# Patient Record
Sex: FEMALE | Race: BLACK OR AFRICAN AMERICAN | Employment: FULL TIME | URBAN - METROPOLITAN AREA
[De-identification: names, ages, dates, MRNs, and addresses within clinical notes are randomized per-mention and may not be internally consistent; named-entity substitution may affect disease eponyms.]

---

## 2022-05-05 PROBLEM — N83.201 BILATERAL OVARIAN CYSTS: Status: ACTIVE | Noted: 2022-05-05

## 2022-05-05 PROBLEM — Z34.00 NORMAL PREGNANCY, FIRST: Status: ACTIVE | Noted: 2022-05-05

## 2022-05-12 PROBLEM — O99.019 SICKLE CELL TRAIT IN MOTHER AFFECTING PREGNANCY (HCC): Status: ACTIVE | Noted: 2022-05-12

## 2022-05-19 VITALS — WEIGHT: 132 LBS | BODY MASS INDEX: 23.76 KG/M2

## 2022-05-26 ENCOUNTER — INITIAL PRENATAL (OUTPATIENT)
Dept: OBGYN CLINIC | Age: 32
End: 2022-05-26

## 2022-05-26 VITALS — BODY MASS INDEX: 24.66 KG/M2 | DIASTOLIC BLOOD PRESSURE: 82 MMHG | SYSTOLIC BLOOD PRESSURE: 122 MMHG | WEIGHT: 137 LBS

## 2022-05-26 DIAGNOSIS — Z11.3 SCREENING FOR STD (SEXUALLY TRANSMITTED DISEASE): ICD-10-CM

## 2022-05-26 DIAGNOSIS — Z34.01 ENCOUNTER FOR SUPERVISION OF NORMAL FIRST PREGNANCY IN FIRST TRIMESTER: Primary | ICD-10-CM

## 2022-05-26 DIAGNOSIS — O99.019 SICKLE CELL TRAIT IN MOTHER AFFECTING PREGNANCY (HCC): ICD-10-CM

## 2022-05-26 DIAGNOSIS — D57.3 SICKLE CELL TRAIT IN MOTHER AFFECTING PREGNANCY (HCC): ICD-10-CM

## 2022-05-26 PROBLEM — Z34.00 NORMAL PREGNANCY, FIRST: Status: ACTIVE | Noted: 2022-05-05

## 2022-05-26 PROCEDURE — 0502F SUBSEQUENT PRENATAL CARE: CPT | Performed by: OBSTETRICS & GYNECOLOGY

## 2022-05-26 NOTE — PROGRESS NOTES
[facility]  presents for NOB-   NOB with santiago was pre-Epic transition:  Genetic Screening / Teratology Counseling (Include's patient, baby's father, or anyone in either family with:)    Patient's age 28 years of older as of estimated date of delivery?   No Avelina's Chorea   No   [facility]assemia ([facility] [place], [address], or  backgrd): MCV less than 80   No Mental Retardation/Autism   Yes   Nural Tube Defect (Meningomyelocele, Spina Bifida, or Anencephaly)   No If Yes, was person tested for Fragile X?     Congential Heart Defect   No Other Inherited Genetic or Chromosomal Disorder   No   Down Syndrome   No Maternal Metabolic Disorder   No   Avinash-Sachs (Ashkenazi Yarsani,   No Patient or [de-identified] Father had a child with birth defects not listed above   No   Canavan Disease (Ashkenazi Yarsani)   No Recurrent Pregnancy Loss or a Stillbirth   No   Familial Dysautonomia (Ashkenazi Yarsani)   No Medications (including supplements, vitamins, herbs or OTC drugs)/illicit/recreational drugs/alcohol since last menstrual period   Yes   Sickle Cell Disease or Trait ()   Yes If Yes, agent(s) and strength/dosage     Hemophilia or Other Blood Disorders   No List any other genetic risks:     Muscular Dystrophy   No Cystic Fibrosis   No   Comments/Counseling:            GC/CT sent on urine, up to date on pap. Tatiana Valdez. PL and MFM notes reviewed as applicable.   Taking Prenatal Vitamins: Yes  She is noticing:  no unusual complaints

## 2022-05-30 LAB
N GONORRHOEA RRNA SPEC QL NAA+PROBE: NEGATIVE
SPECIMEN SOURCE: NORMAL

## 2022-05-31 LAB
C TRACH RRNA SPEC QL NAA+PROBE: NEGATIVE
SPECIMEN SOURCE: NORMAL

## 2022-06-08 ENCOUNTER — ROUTINE PRENATAL (OUTPATIENT)
Dept: OBGYN CLINIC | Age: 32
End: 2022-06-08
Payer: COMMERCIAL

## 2022-06-08 VITALS — DIASTOLIC BLOOD PRESSURE: 77 MMHG | SYSTOLIC BLOOD PRESSURE: 116 MMHG | HEART RATE: 74 BPM

## 2022-06-08 DIAGNOSIS — O99.611 CONSTIPATION IN PREGNANCY IN FIRST TRIMESTER: ICD-10-CM

## 2022-06-08 DIAGNOSIS — K59.00 CONSTIPATION IN PREGNANCY IN FIRST TRIMESTER: ICD-10-CM

## 2022-06-08 DIAGNOSIS — N83.202 BILATERAL OVARIAN CYSTS: Primary | ICD-10-CM

## 2022-06-08 DIAGNOSIS — O21.9 NAUSEA AND VOMITING IN PREGNANCY: ICD-10-CM

## 2022-06-08 DIAGNOSIS — O99.019 SICKLE CELL TRAIT IN MOTHER AFFECTING PREGNANCY (HCC): ICD-10-CM

## 2022-06-08 DIAGNOSIS — D57.3 SICKLE CELL TRAIT IN MOTHER AFFECTING PREGNANCY (HCC): ICD-10-CM

## 2022-06-08 DIAGNOSIS — N83.201 BILATERAL OVARIAN CYSTS: Primary | ICD-10-CM

## 2022-06-08 DIAGNOSIS — N28.89 LEFT KIDNEY MASS: ICD-10-CM

## 2022-06-08 DIAGNOSIS — O09.91 HIGH-RISK PREGNANCY IN FIRST TRIMESTER: ICD-10-CM

## 2022-06-08 DIAGNOSIS — Z3A.13 13 WEEKS GESTATION OF PREGNANCY: ICD-10-CM

## 2022-06-08 PROCEDURE — 99204 OFFICE O/P NEW MOD 45 MIN: CPT | Performed by: OBSTETRICS & GYNECOLOGY

## 2022-06-08 RX ORDER — ACETAMINOPHEN 500 MG
500 TABLET ORAL AS NEEDED
COMMUNITY

## 2022-06-08 ASSESSMENT — PATIENT HEALTH QUESTIONNAIRE - PHQ9
SUM OF ALL RESPONSES TO PHQ QUESTIONS 1-9: 1
2. FEELING DOWN, DEPRESSED OR HOPELESS: 0
SUM OF ALL RESPONSES TO PHQ QUESTIONS 1-9: 1
1. LITTLE INTEREST OR PLEASURE IN DOING THINGS: 1
SUM OF ALL RESPONSES TO PHQ9 QUESTIONS 1 & 2: 1
SUM OF ALL RESPONSES TO PHQ QUESTIONS 1-9: 1
SUM OF ALL RESPONSES TO PHQ QUESTIONS 1-9: 1

## 2022-06-08 NOTE — PROGRESS NOTES
pregnancy in first trimester 06/08/2022     Priority: Low     06/08/22 at [facility]: Patient counseled on stool softener for constipation.  Nausea and vomiting in pregnancy 06/08/2022     Priority: Low     06/08/22 at [facility]: Patient states B6 is not working well. Pt states she has zofran at home. Pt counseled on using stool softener with Zofran.  Sickle cell trait in mother affecting pregnancy ([address] 05/12/2022     Priority: Low     - FOB ____________  - Urine culture q trimester _____       [employer] Normal pregnancy, first 05/05/2022     Priority: Low     Genetic counseling was performed by physician after reviewing patient's genetic history. The patient's Down syndrome age associated risk, as well as, risks of additional aneuploidy and genetic syndromes, are reduced by approximately 50% with a normal first trimester anatomy including, nuchal translucency and nasal bone. Ultrasound alone does not rule out all abnormalities of genetics and development. Maternal serum screening for aneuploidy was discussed with the patient including first trimester SUBHA-A/hCG, second trimester Quad screen (either in isolation or sequential with SUBHA-A) as well as non-invasive prenatal testing (NIPT) for aneuploidy from a maternal blood sample. Positive predictive and negative predictive values for these tests were explained, questions answered. Patient understands that these are screening tests that only assesses risk for select abnormalities (trisomies 15, 25, and 21, and sex chromosome abnormalities (NIPT), as well as markers for placental health (SUBHA-A) and risk for open neural tube defects (quad)). NIPT is designed for high risk populations, but should be considered by all patients who desire the current best option for screening for applicable genetic abnormalities. Limitations of technology discussed based on maternal age, technical aspects of tests, and maternal BMI reviewed.   All questions answered and concerns discussed. Patient elected to proceed with Ultrasound only with no serum screening. 1) Bilateral Ovarian Masses  Likely dermoid in origin  Pain precautions  If torsion, will need surgery. If requires surgery, plan indocin course  Would not expect significant change in size due to pregnancy hormones. 2) Renal concern- records requested. Conservative management at this time. Addressed normal pregnancy complaints, reassured and offered suggestions for care  Reviewed gestational age precautions and activity goals/limitations  Nutritional counseling as well as specific goals based on current maternal and fetal status  Options for GERD therapy if becomes symptomatic over course of pregnancy  Gestational age appropriate preventive care regarding communicable disease transmission and vaccines as appropriate (including flu, TDaP, and COVID.)  Additional plans and concerns as documented in problem list.   All questions answered and concerns discussed. Will have pt return in 4-6 weeks for anatomy and ovarian follow up. I have spent 50 minutes reviewing previous notes, test results and face to face with the patient discussing the diagnosis and importance of compliance with the treatment plan, in addition to ultrasound findings, as well as documenting on the date of the visit (6/9/2022)       An electronic signature was used to authenticate this note.   Sergei Waldrop MD    Patient Active Problem List   Diagnosis Code    Normal pregnancy, first Z34.00    Bilateral ovarian cysts N83.201, N83.202    Sickle cell trait in mother affecting [facility] ([facility] O99.019, D57.3    High-risk pregnancy in first trimester O09.91    Constipation in pregnancy in first trimester O99.611, K59.00    Nausea and vomiting in pregnancy O21.9    Left kidney mass N28.89

## 2022-06-23 ENCOUNTER — ROUTINE PRENATAL (OUTPATIENT)
Dept: OBGYN CLINIC | Age: 32
End: 2022-06-23

## 2022-06-23 VITALS — SYSTOLIC BLOOD PRESSURE: 120 MMHG | DIASTOLIC BLOOD PRESSURE: 70 MMHG | WEIGHT: 139 LBS | BODY MASS INDEX: 25.02 KG/M2

## 2022-06-23 DIAGNOSIS — O09.91 HIGH-RISK PREGNANCY IN FIRST TRIMESTER: ICD-10-CM

## 2022-06-23 DIAGNOSIS — N28.89 LEFT KIDNEY MASS: ICD-10-CM

## 2022-06-23 DIAGNOSIS — O99.611 CONSTIPATION IN PREGNANCY IN FIRST TRIMESTER: ICD-10-CM

## 2022-06-23 DIAGNOSIS — K59.00 CONSTIPATION IN PREGNANCY IN FIRST TRIMESTER: ICD-10-CM

## 2022-06-23 DIAGNOSIS — O21.9 NAUSEA AND VOMITING IN PREGNANCY: ICD-10-CM

## 2022-06-23 PROBLEM — Z34.00 NORMAL PREGNANCY, FIRST: Status: RESOLVED | Noted: 2022-05-05 | Resolved: 2022-06-23

## 2022-06-23 PROCEDURE — 99902 PR PRENATAL VISIT: CPT | Performed by: OBSTETRICS & GYNECOLOGY

## 2022-06-23 NOTE — PROGRESS NOTES
Hina Downey  presents for [address]. . 15w1d. PL and MFM notes reviewed as applicable. Taking Prenatal Vitamins: Yes  She is noticing:  no unusual complaints  Start Fe / Ca  Start LDA    No problem-specific Assessment & Plan notes found for this encounter.

## 2022-07-13 ENCOUNTER — ROUTINE PRENATAL (OUTPATIENT)
Dept: OBGYN CLINIC | Age: 32
End: 2022-07-13
Payer: COMMERCIAL

## 2022-07-13 VITALS — DIASTOLIC BLOOD PRESSURE: 72 MMHG | SYSTOLIC BLOOD PRESSURE: 119 MMHG

## 2022-07-13 DIAGNOSIS — Z3A.18 18 WEEKS GESTATION OF PREGNANCY: ICD-10-CM

## 2022-07-13 DIAGNOSIS — D57.3 SICKLE CELL TRAIT IN MOTHER AFFECTING PREGNANCY (HCC): Primary | ICD-10-CM

## 2022-07-13 DIAGNOSIS — N83.209 OVARIAN CYST AFFECTING PREGNANCY IN SECOND TRIMESTER, ANTEPARTUM: ICD-10-CM

## 2022-07-13 DIAGNOSIS — K59.00 CONSTIPATION IN PREGNANCY, SECOND TRIMESTER: ICD-10-CM

## 2022-07-13 DIAGNOSIS — O34.82 OVARIAN CYST AFFECTING PREGNANCY IN SECOND TRIMESTER, ANTEPARTUM: ICD-10-CM

## 2022-07-13 DIAGNOSIS — Z84.1 FAMILY HISTORY OF KIDNEY DISEASE IN MOTHER: ICD-10-CM

## 2022-07-13 DIAGNOSIS — O21.9 NAUSEA AND VOMITING IN PREGNANCY: ICD-10-CM

## 2022-07-13 DIAGNOSIS — N28.89 LEFT KIDNEY MASS: ICD-10-CM

## 2022-07-13 DIAGNOSIS — O99.612 CONSTIPATION IN PREGNANCY, SECOND TRIMESTER: ICD-10-CM

## 2022-07-13 DIAGNOSIS — O09.92 HIGH-RISK PREGNANCY IN SECOND TRIMESTER: ICD-10-CM

## 2022-07-13 DIAGNOSIS — O99.019 SICKLE CELL TRAIT IN MOTHER AFFECTING PREGNANCY (HCC): Primary | ICD-10-CM

## 2022-07-13 PROCEDURE — 76825 ECHO EXAM OF FETAL HEART: CPT | Performed by: OBSTETRICS & GYNECOLOGY

## 2022-07-13 PROCEDURE — 99215 OFFICE O/P EST HI 40 MIN: CPT | Performed by: OBSTETRICS & GYNECOLOGY

## 2022-07-13 PROCEDURE — 76811 OB US DETAILED SNGL FETUS: CPT | Performed by: OBSTETRICS & GYNECOLOGY

## 2022-07-13 RX ORDER — CALCIUM CARBONATE 500(1250)
500 TABLET ORAL DAILY
COMMUNITY

## 2022-07-13 RX ORDER — ACETAMINOPHEN 160 MG
TABLET,DISINTEGRATING ORAL
COMMUNITY

## 2022-07-13 RX ORDER — FERROUS SULFATE 325(65) MG
325 TABLET ORAL
COMMUNITY

## 2022-07-13 ASSESSMENT — PATIENT HEALTH QUESTIONNAIRE - PHQ9
SUM OF ALL RESPONSES TO PHQ9 QUESTIONS 1 & 2: 0
1. LITTLE INTEREST OR PLEASURE IN DOING THINGS: 0
SUM OF ALL RESPONSES TO PHQ QUESTIONS 1-9: 0
2. FEELING DOWN, DEPRESSED OR HOPELESS: 0
SUM OF ALL RESPONSES TO PHQ QUESTIONS 1-9: 0

## 2022-07-13 NOTE — PROGRESS NOTES
Fe  07/13/22 at [facility]: Normal anatomy; limited echo due to early gestational age; 2 anterior fibroids seen. Dopplers WNL, CL 3.46cm    · F/U 8 weeks [facility] growth and repeat echo  · PTL warnings given      Ovarian cyst affecting pregnancy in second trimester, antepartum 05/05/2022     Priority: Medium     - suspicious for dermoids. Diagnosed~  at time of conception.  - advised to avoid surgery until PP. To [facility] for 2nd opinion. 06/08/22 at [facility]: Bilateral dermoids present today, similar size and appearance to prior. 07/13/22 at [facility]: Bilateral dermoid seen today  · Pain precautions given        Sickle cell trait in mother affecting pregnancy ([address] 05/12/2022     Priority: Low     - FOB to get blood work but doesn't suspect he has sickle cell or sickle cell trait  5/5/22 UC Neg  07/13/22 at [facility]:   · Urine culture every trimester          Addressed normal pregnancy complaints, reassured and offered suggestions for care  Reviewed gestational age precautions and activity goals/limitations  Nutritional counseling as well as specific goals based on current maternal and fetal status  Options for GERD therapy if becomes symptomatic over course of pregnancy  Gestational age appropriate preventive care regarding communicable disease transmission and vaccines as appropriate (including flu, TDaP, and COVID.)  Additional plans and concerns as documented in problem list.   All questions answered and concerns discussed. An electronic signature was used to authenticate this note. Jaime Lamar MD    I have spent 40 minutes reviewing previous notes, test results and face to face with the patient discussing the diagnosis and importance of compliance with the treatment plan, in addition to ultrasound findings, as well as documenting on the day of the visit (07/13/2022).     Return in 8 week(s)  Growth  Anatomy      Patient Active Problem List   Diagnosis Code    Ovarian cyst affecting pregnancy in second trimester, antepartum O34.82, N83.209    Sickle cell trait in mother affecting pregnancy ([facility] O99.019, D57.3    High-risk pregnancy in second trimester O09.92    Family history of kidney disease in mother Z80.4

## 2022-07-18 ENCOUNTER — ROUTINE PRENATAL (OUTPATIENT)
Dept: OBGYN CLINIC | Age: 32
End: 2022-07-18

## 2022-07-18 VITALS — DIASTOLIC BLOOD PRESSURE: 66 MMHG | WEIGHT: 141 LBS | SYSTOLIC BLOOD PRESSURE: 116 MMHG | BODY MASS INDEX: 25.38 KG/M2

## 2022-07-18 DIAGNOSIS — O09.92 HIGH-RISK PREGNANCY IN SECOND TRIMESTER: Primary | ICD-10-CM

## 2022-07-18 DIAGNOSIS — Z84.1 FAMILY HISTORY OF KIDNEY DISEASE IN MOTHER: ICD-10-CM

## 2022-07-18 DIAGNOSIS — D57.3 SICKLE CELL TRAIT IN MOTHER AFFECTING PREGNANCY (HCC): ICD-10-CM

## 2022-07-18 DIAGNOSIS — O99.019 SICKLE CELL TRAIT IN MOTHER AFFECTING PREGNANCY (HCC): ICD-10-CM

## 2022-07-18 PROCEDURE — 99902 PR PRENATAL VISIT: CPT | Performed by: OBSTETRICS & GYNECOLOGY

## 2022-07-18 NOTE — PROGRESS NOTES
Charisse Frost  presents for [address]. . 18w5d. PL and MFM notes reviewed as applicable. Taking Prenatal Vitamins: Yes  She is noticing:  no unusual complaints  Already voided, will get culture NV  FOB to have bloodwork today    No problem-specific Assessment & Plan notes found for this encounter.

## 2022-07-22 ENCOUNTER — TELEPHONE (OUTPATIENT)
Dept: OBGYN CLINIC | Age: 32
End: 2022-07-22

## 2022-07-22 NOTE — TELEPHONE ENCOUNTER
VM from pt c/o sharp \"ovary\" pain for 10-15 seconds then dull, achy back pain. Per , no intervention needed at this time. Call back to pt. No answer. LM with pain protocol and to call us prn.

## 2022-08-01 ENCOUNTER — TELEPHONE (OUTPATIENT)
Dept: OBGYN CLINIC | Age: 32
End: 2022-08-01

## 2022-08-01 NOTE — TELEPHONE ENCOUNTER
See previous note. Advised pt on Monistat7 QHS x 7. Avoid tub baths and wet bathing suits for 7 days. Pt states understanding.

## 2022-08-01 NOTE — TELEPHONE ENCOUNTER
VM received from pt c/o vaginal itching that started yesterday. P tis 20w5d. Call back to pt. No answer. LM to return call.

## 2022-08-10 ENCOUNTER — ROUTINE PRENATAL (OUTPATIENT)
Dept: OBGYN CLINIC | Age: 32
End: 2022-08-10

## 2022-08-10 VITALS — DIASTOLIC BLOOD PRESSURE: 62 MMHG | WEIGHT: 152 LBS | BODY MASS INDEX: 27.36 KG/M2 | SYSTOLIC BLOOD PRESSURE: 108 MMHG

## 2022-08-10 DIAGNOSIS — O99.019 SICKLE CELL TRAIT IN MOTHER AFFECTING PREGNANCY (HCC): ICD-10-CM

## 2022-08-10 DIAGNOSIS — D57.3 SICKLE CELL TRAIT IN MOTHER AFFECTING PREGNANCY (HCC): ICD-10-CM

## 2022-08-10 DIAGNOSIS — O09.92 HIGH-RISK PREGNANCY IN SECOND TRIMESTER: Primary | ICD-10-CM

## 2022-08-10 DIAGNOSIS — Z3A.22 22 WEEKS GESTATION OF PREGNANCY: ICD-10-CM

## 2022-08-10 DIAGNOSIS — O09.92 HIGH-RISK PREGNANCY IN SECOND TRIMESTER: ICD-10-CM

## 2022-08-10 DIAGNOSIS — Z84.1 FAMILY HISTORY OF KIDNEY DISEASE IN MOTHER: ICD-10-CM

## 2022-08-10 PROCEDURE — 99902 PR PRENATAL VISIT: CPT | Performed by: OBSTETRICS & GYNECOLOGY

## 2022-08-10 NOTE — PROGRESS NOTES
Bronwyn HoltEustis  presents for Rosa Isela [address]. . 22w0d. PL and MFM notes reviewed as applicable. Taking Prenatal Vitamins: Yes  She is noticing:  no unusual complaints. Urine culture sent today. No problem-specific Assessment & Plan notes found for this encounter.

## 2022-08-13 LAB
BACTERIA SPEC CULT: NORMAL
SERVICE CMNT-IMP: NORMAL

## 2022-09-07 ENCOUNTER — ROUTINE PRENATAL (OUTPATIENT)
Dept: OBGYN CLINIC | Age: 32
End: 2022-09-07
Payer: COMMERCIAL

## 2022-09-07 VITALS — DIASTOLIC BLOOD PRESSURE: 67 MMHG | SYSTOLIC BLOOD PRESSURE: 107 MMHG

## 2022-09-07 DIAGNOSIS — O26.832 RENAL DISEASE DURING PREGNANCY IN SECOND TRIMESTER: ICD-10-CM

## 2022-09-07 DIAGNOSIS — N83.209 OVARIAN CYST AFFECTING PREGNANCY IN SECOND TRIMESTER, ANTEPARTUM: ICD-10-CM

## 2022-09-07 DIAGNOSIS — O34.82 OVARIAN CYST AFFECTING PREGNANCY IN SECOND TRIMESTER, ANTEPARTUM: ICD-10-CM

## 2022-09-07 DIAGNOSIS — O99.019 SICKLE CELL TRAIT IN MOTHER AFFECTING PREGNANCY (HCC): ICD-10-CM

## 2022-09-07 DIAGNOSIS — Z84.1 FAMILY HISTORY OF KIDNEY DISEASE IN MOTHER: ICD-10-CM

## 2022-09-07 DIAGNOSIS — D57.3 SICKLE CELL TRAIT IN MOTHER AFFECTING PREGNANCY (HCC): ICD-10-CM

## 2022-09-07 DIAGNOSIS — O09.92 HIGH-RISK PREGNANCY IN SECOND TRIMESTER: Primary | ICD-10-CM

## 2022-09-07 PROCEDURE — 76816 OB US FOLLOW-UP PER FETUS: CPT | Performed by: OBSTETRICS & GYNECOLOGY

## 2022-09-07 PROCEDURE — 99215 OFFICE O/P EST HI 40 MIN: CPT | Performed by: OBSTETRICS & GYNECOLOGY

## 2022-09-07 ASSESSMENT — PATIENT HEALTH QUESTIONNAIRE - PHQ9
SUM OF ALL RESPONSES TO PHQ QUESTIONS 1-9: 0
2. FEELING DOWN, DEPRESSED OR HOPELESS: 0
1. LITTLE INTEREST OR PLEASURE IN DOING THINGS: 0
SUM OF ALL RESPONSES TO PHQ QUESTIONS 1-9: 0
SUM OF ALL RESPONSES TO PHQ QUESTIONS 1-9: 0
SUM OF ALL RESPONSES TO PHQ9 QUESTIONS 1 & 2: 0
SUM OF ALL RESPONSES TO PHQ QUESTIONS 1-9: 0

## 2022-09-07 NOTE — PROGRESS NOTES
MFM Consultation    Ana Vaughn (: 1990) is a 36 y.o. Amelia Neeru at 26w0d with 2022, by Last Menstrual Period. 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 Scheduled to see Primary OB [facility]) on 2022. No edema. Denies preeclamptic symptoms. Reports good fetal movement. No bleeding, LOF, or vaginal pressure. Pt reports occasional cramping and ctxs. Significant pain this spring resulting in work up that identified bilateral dermoid ovarian masses and left renal mass. No change in size/appearance of ovarian masses over past 4 months. Pt is seeing renal re renal mass. Conservative care recommended at this time. Interval history since prior appt reviewed and updated as indicated. Review of Systems - per HPI; otherwise unremarkable. Exam:     Vitals:    22 1550   BP: 107/67       Ultrasound: Please see formal ultrasound report under imaging tab. ASSESSMENT/PLAN:  Patient Active Problem List    Diagnosis Date Noted    Renal disease during pregnancy in second trimester 2022     Priority: High     22 at [facility]: Left renal mass present today. Patient saw nephrologist at [facility] ([phone]); Records requested. Per pt report, will monitor. 22 at [facility]: Pt reports having MRI 3/24/22 on Kidneys. Pt reports Nephrologist said that he didn't suspect cancer but will do a Biopsy postpartum 2022. Pt reports occasional discomfort over left kidney which Nephrologist was not concerned about. Message left at [facility] to obtain records. High-risk pregnancy in second trimester 2022     Priority: High     22 at [facility]: Normal NT and nasal bone. NT 1.5mm. NIPT declined.    2022 15w1d start LDA, Ca / Fe  22 at [facility]: Normal anatomy; limited echo due to early gestational age; 2 anterior fibroids seen. Dopplers WNL, CL 3.46cm  09/07/22 at [facility]: Appropriate fetal growth; Normal repeat echo; AC 42 %, Overall 48  %, GUERO 17 cm, Dopplers WNL, BPP 8/8    No F/U MFM-refer back as needed  PTL warnings given  May deliver vaginally      Ovarian cyst affecting pregnancy in second trimester, antepartum 05/05/2022     Priority: Medium     - suspicious for dermoids. Diagnosed~  at time of conception.  - advised to avoid surgery until PP. To [facility] for 2nd opinion. 06/08/22 at [facility]: Bilateral dermoids present today, similar size and appearance to prior. 07/13/22 at [facility]: Bilateral dermoid seen today  09/07/22 at [facility]: No change  Pain precautions given        Sickle cell trait in mother affecting pregnancy ([facility] 05/12/2022     Priority: Low     - FOB to get blood work but doesn't suspect he has sickle cell or sickle cell trait   - 7/18/2022 = negative  5/5/22 UC Neg  09/07/22 at [facility]:   8/1/22 Neg          Addressed normal pregnancy complaints, reassured and offered suggestions for care  Reviewed gestational age precautions and activity goals/limitations  Nutritional counseling as well as specific goals based on current maternal and fetal status  Options for GERD therapy if becomes symptomatic over course of pregnancy  Gestational age appropriate preventive care regarding communicable disease transmission and vaccines as appropriate (including flu, TDaP, and COVID.)  Additional plans and concerns as documented in problem list.   All questions answered and concerns discussed. An electronic signature was used to authenticate this note.   Janes Shaikh MD    I have spent 40 minutes reviewing previous notes, test results and face to face with the patient discussing the diagnosis and importance of compliance with the treatment plan, in addition to ultrasound findings, as well as documenting on the day of the visit (09/07/2022).       Patient Active Problem List   Diagnosis Code    Ovarian cyst affecting pregnancy in second trimester, antepartum O34.82, N83.209    Sickle cell trait in mother affecting pregnancy ([facility] O99.019, D57.3    High-risk pregnancy in second trimester O09.92    Renal disease during pregnancy in second trimester O26.832

## 2022-09-12 ENCOUNTER — ROUTINE PRENATAL (OUTPATIENT)
Dept: OBGYN CLINIC | Age: 32
End: 2022-09-12

## 2022-09-12 VITALS — SYSTOLIC BLOOD PRESSURE: 110 MMHG | DIASTOLIC BLOOD PRESSURE: 62 MMHG | WEIGHT: 159 LBS | BODY MASS INDEX: 28.62 KG/M2

## 2022-09-12 DIAGNOSIS — O99.019 SICKLE CELL TRAIT IN MOTHER AFFECTING PREGNANCY (HCC): ICD-10-CM

## 2022-09-12 DIAGNOSIS — O09.92 HIGH-RISK PREGNANCY IN SECOND TRIMESTER: Primary | ICD-10-CM

## 2022-09-12 DIAGNOSIS — Z13.0 SCREENING FOR IRON DEFICIENCY ANEMIA: ICD-10-CM

## 2022-09-12 DIAGNOSIS — O26.832 RENAL DISEASE DURING PREGNANCY IN SECOND TRIMESTER: ICD-10-CM

## 2022-09-12 DIAGNOSIS — N83.209 OVARIAN CYST AFFECTING PREGNANCY IN SECOND TRIMESTER, ANTEPARTUM: ICD-10-CM

## 2022-09-12 DIAGNOSIS — Z13.1 SCREENING FOR DIABETES MELLITUS: ICD-10-CM

## 2022-09-12 DIAGNOSIS — O34.82 OVARIAN CYST AFFECTING PREGNANCY IN SECOND TRIMESTER, ANTEPARTUM: ICD-10-CM

## 2022-09-12 DIAGNOSIS — D57.3 SICKLE CELL TRAIT IN MOTHER AFFECTING PREGNANCY (HCC): ICD-10-CM

## 2022-09-12 LAB
BASOPHILS # BLD: 0 K/UL (ref 0–0.2)
BASOPHILS NFR BLD: 0 % (ref 0–2)
DIFFERENTIAL METHOD BLD: ABNORMAL
EOSINOPHIL # BLD: 0.1 K/UL (ref 0–0.8)
EOSINOPHIL NFR BLD: 1 % (ref 0.5–7.8)
ERYTHROCYTE [DISTWIDTH] IN BLOOD BY AUTOMATED COUNT: 12.3 % (ref 11.9–14.6)
HCT VFR BLD AUTO: 33.6 % (ref 35.8–46.3)
HGB BLD-MCNC: 11.6 G/DL (ref 11.7–15.4)
IMM GRANULOCYTES # BLD AUTO: 0 K/UL (ref 0–0.5)
IMM GRANULOCYTES NFR BLD AUTO: 1 % (ref 0–5)
LYMPHOCYTES # BLD: 1.4 K/UL (ref 0.5–4.6)
LYMPHOCYTES NFR BLD: 17 % (ref 13–44)
MCH RBC QN AUTO: 33.3 PG (ref 26.1–32.9)
MCHC RBC AUTO-ENTMCNC: 34.5 G/DL (ref 31.4–35)
MCV RBC AUTO: 96.6 FL (ref 79.6–97.8)
MONOCYTES # BLD: 0.7 K/UL (ref 0.1–1.3)
MONOCYTES NFR BLD: 8 % (ref 4–12)
NEUTS SEG # BLD: 6.1 K/UL (ref 1.7–8.2)
NEUTS SEG NFR BLD: 73 % (ref 43–78)
NRBC # BLD: 0 K/UL (ref 0–0.2)
PLATELET # BLD AUTO: 223 K/UL (ref 150–450)
PMV BLD AUTO: 10 FL (ref 9.4–12.3)
RBC # BLD AUTO: 3.48 M/UL (ref 4.05–5.2)
WBC # BLD AUTO: 8.3 K/UL (ref 4.3–11.1)

## 2022-09-12 PROCEDURE — 99902 PR PRENATAL VISIT: CPT | Performed by: OBSTETRICS & GYNECOLOGY

## 2022-09-12 NOTE — PROGRESS NOTES
Suasn King  presents for ELISA w/ 1 hr gtt & CBC. . 26w5d. PL and MFM notes reviewed as applicable. Taking Prenatal Vitamins: Yes  She is noticing:  no unusual complaints     No problem-specific Assessment & Plan notes found for this encounter.

## 2022-09-14 LAB — GLUCOSE 1 HOUR: 146 MG/DL

## 2022-09-26 ENCOUNTER — ROUTINE PRENATAL (OUTPATIENT)
Dept: OBGYN CLINIC | Age: 32
End: 2022-09-26

## 2022-09-26 VITALS — SYSTOLIC BLOOD PRESSURE: 114 MMHG | WEIGHT: 159 LBS | BODY MASS INDEX: 28.62 KG/M2 | DIASTOLIC BLOOD PRESSURE: 66 MMHG

## 2022-09-26 DIAGNOSIS — O26.832 RENAL DISEASE DURING PREGNANCY IN SECOND TRIMESTER: ICD-10-CM

## 2022-09-26 DIAGNOSIS — O09.92 HIGH-RISK PREGNANCY IN SECOND TRIMESTER: Primary | ICD-10-CM

## 2022-09-26 PROCEDURE — 99902 PR PRENATAL VISIT: CPT | Performed by: OBSTETRICS & GYNECOLOGY

## 2022-09-26 NOTE — PROGRESS NOTES
Elisabeth Garcia  presents for [address]. . 28w5d. PL and MFM notes reviewed as applicable. Taking Prenatal Vitamins: Yes  She is noticing:  no unusual complaints    No problem-specific Assessment & Plan notes found for this encounter.

## 2022-09-30 ENCOUNTER — NURSE ONLY (OUTPATIENT)
Dept: OBGYN CLINIC | Age: 32
End: 2022-09-30

## 2022-09-30 DIAGNOSIS — Z13.1 SCREENING FOR DIABETES MELLITUS: Primary | ICD-10-CM

## 2022-09-30 LAB
GESTATIONAL 3HR GTT: ABNORMAL
GLUCOSE 1H P 100 G GLC PO SERPL-MCNC: 198 MG/DL (ref 65–180)
GLUCOSE 2 HOUR: 194 MG/DL (ref 65–155)
GLUCOSE P FAST SERPL-MCNC: 80 MG/DL (ref 65–95)
GLUCOSE, 3 HOUR: 148 MG/DL (ref 65–140)

## 2022-10-06 ENCOUNTER — TELEPHONE (OUTPATIENT)
Dept: OBGYN CLINIC | Age: 32
End: 2022-10-06

## 2022-10-06 DIAGNOSIS — O24.419 GESTATIONAL DIABETES MELLITUS (GDM) IN THIRD TRIMESTER, GESTATIONAL DIABETES METHOD OF CONTROL UNSPECIFIED: Primary | ICD-10-CM

## 2022-10-06 RX ORDER — LANCETS 30 GAUGE
1 EACH MISCELLANEOUS DAILY
Qty: 200 EACH | Refills: 4 | Status: SHIPPED | OUTPATIENT
Start: 2022-10-06

## 2022-10-06 RX ORDER — GLUCOSAMINE HCL/CHONDROITIN SU 500-400 MG
CAPSULE ORAL
Qty: 200 STRIP | Refills: 4 | Status: SHIPPED | OUTPATIENT
Start: 2022-10-06

## 2022-10-06 RX ORDER — BLOOD-GLUCOSE METER
KIT MISCELLANEOUS
Qty: 1 KIT | Refills: 0 | Status: SHIPPED | OUTPATIENT
Start: 2022-10-06

## 2022-10-06 NOTE — TELEPHONE ENCOUNTER
Pt called with questions regarding GDM dx. Reviewed how to check blood sugars,keep a log and diet restrictions. Pt voiced understanding. Informed pt that referrals have been sent to Nutrition and MFM and supplies have been sent to pharmacy. Pt voiced understanding.

## 2022-10-10 ENCOUNTER — ROUTINE PRENATAL (OUTPATIENT)
Dept: OBGYN CLINIC | Age: 32
End: 2022-10-10

## 2022-10-10 VITALS — DIASTOLIC BLOOD PRESSURE: 68 MMHG | BODY MASS INDEX: 28.44 KG/M2 | WEIGHT: 158 LBS | SYSTOLIC BLOOD PRESSURE: 108 MMHG

## 2022-10-10 DIAGNOSIS — O24.419 GESTATIONAL DIABETES MELLITUS (GDM) IN THIRD TRIMESTER, GESTATIONAL DIABETES METHOD OF CONTROL UNSPECIFIED: ICD-10-CM

## 2022-10-10 DIAGNOSIS — O26.833 RENAL DISEASE DURING PREGNANCY IN THIRD TRIMESTER: ICD-10-CM

## 2022-10-10 DIAGNOSIS — O09.93 HIGH-RISK PREGNANCY IN THIRD TRIMESTER: Primary | ICD-10-CM

## 2022-10-10 PROCEDURE — 99902 PR PRENATAL VISIT: CPT | Performed by: OBSTETRICS & GYNECOLOGY

## 2022-10-10 NOTE — PROGRESS NOTES
[facility]  presents for Rosa Isela [address]. . 30w5d. PL and MFM notes reviewed as applicable. Taking Prenatal Vitamins: Yes  She is noticing:  no unusual complaints. Has started checking sugars (GDM). They are really low, recommend she start adding some carbs back in slowly. Ranges ~120 +/- PP encouraged. Sees dietician next week, MFM soon. No problem-specific Assessment & Plan notes found for this encounter.

## 2022-10-11 ENCOUNTER — CLINICAL DOCUMENTATION (OUTPATIENT)
Dept: OBGYN CLINIC | Age: 32
End: 2022-10-11

## 2022-10-11 NOTE — PROGRESS NOTES
Email message sent to patient regarding upcoming appt with MFM. Information sent to patient on how to check blood sugars, what to expect with GDM and blood sugar log for her to document blood sugars everyday until she comes to her appt. Pt instructed bring log with her to her appt.

## 2022-10-13 ENCOUNTER — TELEPHONE (OUTPATIENT)
Dept: OBGYN CLINIC | Age: 32
End: 2022-10-13

## 2022-10-13 NOTE — TELEPHONE ENCOUNTER
Pt called with c/o pubic bone pain. Pt states the pain improves with walking. Pt denies LOF, vaginal bleeding and/or contractions. Pt reports good fetal movement. Encouraged pt to use belly band and tylenol. Pt agree's and voiced understanding.

## 2022-10-17 ENCOUNTER — HOSPITAL ENCOUNTER (OUTPATIENT)
Dept: DIABETES SERVICES | Age: 32
Setting detail: RECURRING SERIES
Discharge: HOME | End: 2022-10-20
Payer: COMMERCIAL

## 2022-10-17 VITALS — WEIGHT: 159 LBS | BODY MASS INDEX: 28.62 KG/M2

## 2022-10-17 PROCEDURE — G0109 DIAB MANAGE TRN IND/GROUP: HCPCS

## 2022-10-17 NOTE — PROGRESS NOTES
Gestational Diabetes Self-Management Support Plan    Services Provided: Pt received education on nutrition and meal planning during pregnancy. Emotional support for adjustment to diagnosis was provided. Care Plan/Recommendations:  Pt instructed to record blood sugar 4x/day and record all meals and snacks. Pt to bring information to appointments with [facility]. Notes for Follow Up:   1. Barriers to checking blood glucose and adherence to meal plan identified: none  2. Barriers to psychological adjustment to diagnosis identified: none  3. Patient needs to be seen by [facility] ASAP due to: pt unsure if she has an appointment.        Lieutenant Loving, [address] [place] [address], LD, CDE  [facility] [address] [facility]

## 2022-10-23 ENCOUNTER — HOSPITAL ENCOUNTER (EMERGENCY)
Age: 32
Discharge: HOME | End: 2022-10-23
Attending: EMERGENCY MEDICINE | Admitting: EMERGENCY MEDICINE
Payer: COMMERCIAL

## 2022-10-23 VITALS
DIASTOLIC BLOOD PRESSURE: 62 MMHG | RESPIRATION RATE: 18 BRPM | WEIGHT: 159 LBS | TEMPERATURE: 100 F | HEIGHT: 62 IN | SYSTOLIC BLOOD PRESSURE: 114 MMHG | OXYGEN SATURATION: 99 % | BODY MASS INDEX: 29.26 KG/M2 | HEART RATE: 99 BPM

## 2022-10-23 DIAGNOSIS — J10.1 INFLUENZA A: Primary | ICD-10-CM

## 2022-10-23 LAB
FLUAV AG NPH QL IA: POSITIVE
FLUBV AG NPH QL IA: NEGATIVE
SARS-COV-2 RDRP RESP QL NAA+PROBE: NOT DETECTED
SOURCE: NORMAL
SPECIMEN SOURCE: ABNORMAL
STREP, MOLECULAR: NOT DETECTED

## 2022-10-23 PROCEDURE — 87651 STREP A DNA AMP PROBE: CPT

## 2022-10-23 PROCEDURE — 2580000003 HC RX 258

## 2022-10-23 PROCEDURE — 99284 EMERGENCY DEPT VISIT MOD MDM: CPT

## 2022-10-23 PROCEDURE — 87635 SARS-COV-2 COVID-19 AMP PRB: CPT

## 2022-10-23 PROCEDURE — 87804 INFLUENZA ASSAY W/OPTIC: CPT

## 2022-10-23 RX ORDER — OSELTAMIVIR PHOSPHATE 75 MG/1
75 CAPSULE ORAL 2 TIMES DAILY
Qty: 10 CAPSULE | Refills: 0 | Status: SHIPPED | OUTPATIENT
Start: 2022-10-23 | End: 2022-10-28

## 2022-10-23 RX ORDER — ACETAMINOPHEN 325 MG/1
650 TABLET ORAL
Status: DISCONTINUED | OUTPATIENT
Start: 2022-10-23 | End: 2022-10-23 | Stop reason: HOSPADM

## 2022-10-23 RX ORDER — 0.9 % SODIUM CHLORIDE 0.9 %
1000 INTRAVENOUS SOLUTION INTRAVENOUS
Status: COMPLETED | OUTPATIENT
Start: 2022-10-23 | End: 2022-10-23

## 2022-10-23 RX ADMIN — SODIUM CHLORIDE 1000 ML: 9 INJECTION, SOLUTION INTRAVENOUS at 18:29

## 2022-10-23 ASSESSMENT — ENCOUNTER SYMPTOMS
ABDOMINAL PAIN: 0
COUGH: 1
SHORTNESS OF BREATH: 0
SORE THROAT: 1

## 2022-10-23 ASSESSMENT — PAIN - FUNCTIONAL ASSESSMENT: PAIN_FUNCTIONAL_ASSESSMENT: 0-10

## 2022-10-23 ASSESSMENT — PAIN SCALES - GENERAL: PAINLEVEL_OUTOF10: 7

## 2022-10-23 NOTE — Clinical Note
Poonam Campuzano was seen and treated in our emergency department on 10/23/2022. She may return to work on 10/27/2022. If you have any questions or concerns, please don't hesitate to call.       STONE Farmer

## 2022-10-23 NOTE — ED TRIAGE NOTES
Pt is 32 weeks pregnant. States she has had a cough, fever, bodyaches, headache, and sore throat since this morning.

## 2022-10-23 NOTE — ED PROVIDER NOTES
[facility] Emergency Department Provider Note                   PCP:                Aditi Jeter MD               Age: 36 y.o. Sex: female       ICD-10-CM    1. Influenza A  J10.1           DISPOSITION Decision To Discharge 10/23/2022 07:03:13 PM        Orders Placed This Encounter   Procedures    Rapid influenza A/B antigens    Group A Strep Screen By PCR    COVID-19, Rapid        Aman Bishop is a 36 y.o. female who presents to the Emergency Department with chief complaint of    Chief Complaint   Patient presents with    Cough      59-year-old female whom is 32 weeks pregnant is presenting to the emergency department with chief complaint of 1 day history of cough and congestion that has worsened today. She is complaining of associated sore throat, headaches, nausea, myalgias and arthralgias, and fevers with T-max of 100.1F. Reports she has not taken anything over-the-counter for symptom relief and has minimal appetite. She denies any recent sick contacts however she is a teacher in a public setting. She denies any ear pain, chest pain, and/or shortness of breath. The patient is somnolent with minimal interaction on exam; the the  has given me most of the history however the patient nods yes and no throughout the history and is able to speak for herself. The history is provided by the patient and the spouse. All other systems reviewed and are negative. Review of Systems   Constitutional:  Positive for activity change, appetite change, fatigue and fever. HENT:  Positive for congestion and sore throat. Negative for ear pain. Respiratory:  Positive for cough. Negative for shortness of breath. Cardiovascular:  Negative for chest pain. Gastrointestinal:  Negative for abdominal pain. Musculoskeletal:  Positive for arthralgias and myalgias. Neurological:  Positive for headaches. All other systems reviewed and are negative.     Past Medical History:   Diagnosis Date Constipation in pregnancy, second trimester 6/8/2022 06/08/22 at [facility]: Patient counseled on stool softener for constipation. 07/13/22 at [facility]: Reports improvement  RESOLVED    Nausea and vomiting in pregnancy 6/8/2022 06/08/22 at [facility]: Patient states B6 is not working well. Pt states she has zofran at home. Pt counseled on using stool softener with Zofran.  07/13/22 at [facility]: Improvement  RESOLVED        Past Surgical History:   Procedure Laterality Date    WISDOM TOOTH EXTRACTION  2010 & 2012        Family History   Problem Relation Age of Onset    Diabetes Maternal Grandfather         Social History     Socioeconomic History    Marital status:    Tobacco Use    Smoking status: Never    Smokeless tobacco: Never   Substance and Sexual Activity    Alcohol use: Not Currently    Drug use: Never        Allergies: Amoxicillin and Amoxicillin-pot clavulanate    Discharge Medication List as of 10/23/2022  7:04 PM        CONTINUE these medications which have NOT CHANGED    Details   glucose monitoring (FREESTYLE FREEDOM) kit Disp-1 kit, R-0, NormalUse as directed to check sugars fasting and 1 hour after the first bite of each meal 4x daily (breakfast, lunch, dinner)      blood glucose monitor strips Use as directed to check sugars fasting and 1 hour after the first bite of each meal 4x daily (breakfast, lunch, dinner). Dispense sufficient amount for indicated testing frequency plus additional to accommodate PRN testing needs. , Disp-200 strip, R-4, N ormal      Lancets MISC DAILY Starting Thu 10/6/2022, Disp-200 each, R-4, NormalUse as directed to check sugars fasting and 1 hour after the first bite of each meal 4x daily (breakfast, lunch, dinner)      calcium carbonate (OSCAL) 500 MG TABS tablet Take 500 mg by mouth dailyHistorical Med      Cholecalciferol (VITAMIN D3) 50 MCG (2000 UT) CAPS Take by mouthHistorical Med      ferrous sulfate (IRON 325) 325 (65 Fe) MG tablet Take 325 mg by mouth daily (with breakfast)Historical Med      acetaminophen (TYLENOL) 500 MG tablet Take 500 mg by mouth as needed for PainHistorical Med      Prenatal MV & Min w/FA-DHA (ONE A DAY PRENATAL PO) Take by mouthHistorical Med              Vitals signs and nursing note reviewed. No data found. Physical Exam  Vitals reviewed. Constitutional:       General: She is not in acute distress. Appearance: Normal appearance. She is normal weight. She is not ill-appearing. Comments: Pt is pregnant   HENT:      Head: Normocephalic and atraumatic. Right Ear: Tympanic membrane, ear canal and external ear normal.      Left Ear: Tympanic membrane, ear canal and external ear normal.      Nose: Nose normal. No congestion or rhinorrhea. Mouth/Throat:      Mouth: Mucous membranes are moist.      Pharynx: Oropharynx is clear. Posterior oropharyngeal erythema present. No oropharyngeal exudate. Eyes:      Extraocular Movements: Extraocular movements intact. Conjunctiva/sclera: Conjunctivae normal.      Pupils: Pupils are equal, round, and reactive to light. Cardiovascular:      Rate and Rhythm: Normal rate and regular rhythm. Pulses: Normal pulses. Heart sounds: Normal heart sounds. No murmur heard. Pulmonary:      Effort: Pulmonary effort is normal. No respiratory distress. Breath sounds: Normal breath sounds. No stridor. No wheezing or rhonchi. Abdominal:      General: Abdomen is flat. Bowel sounds are normal. There is no distension. Palpations: Abdomen is soft. Tenderness: There is no abdominal tenderness. There is no guarding or rebound. Musculoskeletal:         General: No swelling, deformity or signs of injury. Normal range of motion. Cervical back: Normal range of motion and neck supple. No rigidity or tenderness. Skin:     General: Skin is warm and dry. Coloration: Skin is not pale. Findings: No erythema, lesion or rash.    Neurological:      General: No focal deficit present. Mental Status: She is alert and oriented to person, place, and time. Mental status is at baseline. Psychiatric:         Mood and Affect: Affect is flat. Behavior: Behavior normal.         Thought Content: Thought content normal.         Judgment: Judgment normal.        MDM  Number of Diagnoses or Management Options  Influenza A  Diagnosis management comments: 53-year-old female who is 32 weeks pregnant presenting to the emergency department with chief complaint of cough and congestion. Differential COVID, flu, strep, PE    The patient's symptoms and presentation are consistent with that of an upper respiratory infection. Due to the fact that the patient is asymptomatic of chest pain and shortness of breath I have a very low suspicion that the patient has a cardiopulmonary abnormality such as ACS or PE that would require any imaging or additional lab work. Because of the patient's pregnancy status there is a need to rule out streptococcal infection. will also assess for COVID and flu infection with swabs. The patient refuses Tylenol and Zofran for fever and nausea relief, respectively despite her current symptoms and fever with tachycardia. I explained to the patient that both medications were safe to use in pregnancy however she decided against use of both. Normal saline 1 L bolus started. Influenza swab returned positive. Prescribed Tamiflu regimen. Advised patient to increase her fluid intake, take Tylenol for symptom improvement, and return to the emergency department if she experienced any worsening symptoms.        Amount and/or Complexity of Data Reviewed  Clinical lab tests: ordered and reviewed        Procedures      Labs Reviewed   RAPID INFLUENZA A/B ANTIGENS - Abnormal; Notable for the following components:       Result Value    Influenza A Ag Positive (*)     All other components within normal limits   GROUP A STREP SCREEN BY PCR   COVID-19, RAPID        No orders to display                      ED Course as of 10/24/22 0038   Sun Oct 23, 2022   1807 Pt refuses tylenol [ET]   6510 Pending swabs [ET]   1812 Pt refusing zofran. Start NS 1 L. [ET]      ED Course User Index  [ET] STONE Green        Voice dictation software was used during the making of this note. This software is not perfect and grammatical and other typographical errors may be present. This note has not been completely proofread for errors.      STONE Green  10/24/22 8251

## 2022-10-23 NOTE — ED NOTES
I have reviewed discharge instructions with the patient. The patient verbalized understanding. Patient left ED via Discharge Method: ambulatory to Home with spouse. Opportunity for questions and clarification provided. Patient given 1 scripts. No e-sign. To continue your aftercare when you leave the hospital, you may receive an automated call from our care team to check in on how you are doing. This is a free service and part of our promise to provide the best care and service to meet your aftercare needs.  If you have questions, or wish to unsubscribe from this service please call [phone]. Thank you for Choosing our [address] [place] [address] Emergency Department.          Fadi Caldwell RN  10/23/22 1564

## 2022-10-23 NOTE — DISCHARGE INSTRUCTIONS
Your hospital work-up today demonstrates you have tested positive for influenza. We have prescribed Tamiflu antiviral medication which is safe to take in pregnancy. Remain hydrated and take vitamin D, vitamin C, and zinc to boost your immune system. Tylenol is safe to take for fever, body aches, and joint pain in pregnancy. If your symptoms worsen please return to the emergency department for evaluation and treatment.

## 2022-10-23 NOTE — Clinical Note
Kourtney Bautista was seen and treated in our emergency department on 10/23/2022. She may return to work on 10/27/2022. If you have any questions or concerns, please don't hesitate to call.       STONE Kendrick

## 2022-10-24 ENCOUNTER — TELEPHONE (OUTPATIENT)
Dept: OBGYN CLINIC | Age: 32
End: 2022-10-24

## 2022-10-31 ENCOUNTER — TELEPHONE (OUTPATIENT)
Dept: OBGYN CLINIC | Age: 32
End: 2022-10-31

## 2022-10-31 NOTE — TELEPHONE ENCOUNTER
Celia Bergeron that she is having sinus pressure, light headed and having cold sweats. Called her back and Recommended that see if she can get in with PCP or urgent care. She states she is feeling better. If any changes she will be proceed to one or the other. She also said she had had difficulty get blood for testing glucose. Told to increase water intake.   She has an appt on 11/3/22

## 2022-11-03 ENCOUNTER — ROUTINE PRENATAL (OUTPATIENT)
Dept: OBGYN CLINIC | Age: 32
End: 2022-11-03

## 2022-11-03 VITALS — SYSTOLIC BLOOD PRESSURE: 112 MMHG | WEIGHT: 158 LBS | BODY MASS INDEX: 28.9 KG/M2 | DIASTOLIC BLOOD PRESSURE: 74 MMHG

## 2022-11-03 DIAGNOSIS — O09.92 HIGH-RISK PREGNANCY IN SECOND TRIMESTER: ICD-10-CM

## 2022-11-03 DIAGNOSIS — O24.419 GESTATIONAL DIABETES MELLITUS (GDM) IN THIRD TRIMESTER, GESTATIONAL DIABETES METHOD OF CONTROL UNSPECIFIED: ICD-10-CM

## 2022-11-03 DIAGNOSIS — O26.832 RENAL DISEASE DURING PREGNANCY IN SECOND TRIMESTER: Primary | ICD-10-CM

## 2022-11-03 PROCEDURE — 99902 PR PRENATAL VISIT: CPT | Performed by: OBSTETRICS & GYNECOLOGY

## 2022-11-03 NOTE — PROGRESS NOTES
[facility]  presents for Rosa Isela Lewis [age]. . 34w1d. PL and M notes reviewed as applicable. Taking Prenatal Vitamins: Yes  She is noticing:  no unusual complaints  Had flu, much better now. Had a hard time checking sugars, partially d/t being volume depleted. Checking again, states they are about the same as before. REc send to [facility] asap. No problem-specific Assessment & Plan notes found for this encounter.

## 2022-11-14 ENCOUNTER — ROUTINE PRENATAL (OUTPATIENT)
Dept: OBGYN CLINIC | Age: 32
End: 2022-11-14

## 2022-11-14 VITALS
HEIGHT: 62 IN | WEIGHT: 161 LBS | SYSTOLIC BLOOD PRESSURE: 120 MMHG | DIASTOLIC BLOOD PRESSURE: 78 MMHG | BODY MASS INDEX: 29.63 KG/M2

## 2022-11-14 DIAGNOSIS — Z36.85 SCREENING, ANTENATAL, FOR STREPTOCOCCUS B: Primary | ICD-10-CM

## 2022-11-14 DIAGNOSIS — O09.92 HIGH-RISK PREGNANCY IN SECOND TRIMESTER: ICD-10-CM

## 2022-11-14 DIAGNOSIS — O26.832 RENAL DISEASE DURING PREGNANCY IN SECOND TRIMESTER: ICD-10-CM

## 2022-11-14 DIAGNOSIS — Z36.85 SCREENING, ANTENATAL, FOR STREPTOCOCCUS B: ICD-10-CM

## 2022-11-14 PROCEDURE — 99902 PR PRENATAL VISIT: CPT | Performed by: OBSTETRICS & GYNECOLOGY

## 2022-11-14 NOTE — PROGRESS NOTES
Beth Negrete  presents for Rosa Isela Lewis [age]. . 35w5d. PL and MFM notes reviewed as applicable. Taking Prenatal Vitamins: Yes  She is noticing:  no unusual complaints. States sugars good and sending to MFM weekly    No problem-specific Assessment & Plan notes found for this encounter.

## 2022-11-16 ENCOUNTER — ANESTHESIA (OUTPATIENT)
Dept: LABOR AND DELIVERY | Age: 32
End: 2022-11-16
Payer: COMMERCIAL

## 2022-11-16 ENCOUNTER — HOSPITAL ENCOUNTER (INPATIENT)
Age: 32
LOS: 2 days | Discharge: HOME | End: 2022-11-18
Attending: OBSTETRICS & GYNECOLOGY | Admitting: OBSTETRICS & GYNECOLOGY
Payer: COMMERCIAL

## 2022-11-16 ENCOUNTER — ANESTHESIA EVENT (OUTPATIENT)
Dept: LABOR AND DELIVERY | Age: 32
End: 2022-11-16
Payer: COMMERCIAL

## 2022-11-16 PROBLEM — Z37.9 NORMAL LABOR: Status: ACTIVE | Noted: 2022-11-16

## 2022-11-16 PROBLEM — O42.919 PRETERM PREMATURE RUPTURE OF MEMBRANES (PPROM) WITH UNKNOWN ONSET OF LABOR: Status: ACTIVE | Noted: 2022-11-16

## 2022-11-16 LAB
ABO + RH BLD: NORMAL
AMNISURE, POC: POSITIVE
BLOOD GROUP ANTIBODIES SERPL: NORMAL
ERYTHROCYTE [DISTWIDTH] IN BLOOD BY AUTOMATED COUNT: 13 % (ref 11.9–14.6)
GLUCOSE BLD STRIP.AUTO-MCNC: 81 MG/DL (ref 65–100)
HCT VFR BLD AUTO: 34.9 % (ref 35.8–46.3)
HGB BLD-MCNC: 12 G/DL (ref 11.7–15.4)
Lab: NORMAL
MCH RBC QN AUTO: 33 PG (ref 26.1–32.9)
MCHC RBC AUTO-ENTMCNC: 34.4 G/DL (ref 31.4–35)
MCV RBC AUTO: 95.9 FL (ref 82–102)
NEGATIVE QC PASS/FAIL: NORMAL
NRBC # BLD: 0 K/UL (ref 0–0.2)
PLATELET # BLD AUTO: 219 K/UL (ref 150–450)
PMV BLD AUTO: 9.5 FL (ref 9.4–12.3)
POSITIVE QC PASS/FAIL: NORMAL
RBC # BLD AUTO: 3.64 M/UL (ref 4.05–5.2)
SERVICE CMNT-IMP: NORMAL
SPECIMEN EXP DATE BLD: NORMAL
WBC # BLD AUTO: 5.9 K/UL (ref 4.3–11.1)

## 2022-11-16 PROCEDURE — 59025 FETAL NON-STRESS TEST: CPT

## 2022-11-16 PROCEDURE — 2500000003 HC RX 250 WO HCPCS: Performed by: OBSTETRICS & GYNECOLOGY

## 2022-11-16 PROCEDURE — 3700000025 EPIDURAL BLOCK: Performed by: ANESTHESIOLOGY

## 2022-11-16 PROCEDURE — 7210000100 HC LABOR FEE PER 1 HR

## 2022-11-16 PROCEDURE — 2580000003 HC RX 258: Performed by: OBSTETRICS & GYNECOLOGY

## 2022-11-16 PROCEDURE — 87081 CULTURE SCREEN ONLY: CPT

## 2022-11-16 PROCEDURE — 7100000011 HC PHASE II RECOVERY - ADDTL 15 MIN

## 2022-11-16 PROCEDURE — 6360000002 HC RX W HCPCS: Performed by: OBSTETRICS & GYNECOLOGY

## 2022-11-16 PROCEDURE — 00HU33Z INSERTION OF INFUSION DEVICE INTO SPINAL CANAL, PERCUTANEOUS APPROACH: ICD-10-PCS | Performed by: ANESTHESIOLOGY

## 2022-11-16 PROCEDURE — 59400 OBSTETRICAL CARE: CPT | Performed by: OBSTETRICS & GYNECOLOGY

## 2022-11-16 PROCEDURE — 86850 RBC ANTIBODY SCREEN: CPT

## 2022-11-16 PROCEDURE — 82962 GLUCOSE BLOOD TEST: CPT

## 2022-11-16 PROCEDURE — 1100000000 HC RM PRIVATE

## 2022-11-16 PROCEDURE — 7220000101 HC DELIVERY VAGINAL/SINGLE

## 2022-11-16 PROCEDURE — 36415 COLL VENOUS BLD VENIPUNCTURE: CPT

## 2022-11-16 PROCEDURE — 7100000010 HC PHASE II RECOVERY - FIRST 15 MIN

## 2022-11-16 PROCEDURE — 99283 EMERGENCY DEPT VISIT LOW MDM: CPT

## 2022-11-16 PROCEDURE — 6370000000 HC RX 637 (ALT 250 FOR IP): Performed by: OBSTETRICS & GYNECOLOGY

## 2022-11-16 PROCEDURE — 85027 COMPLETE CBC AUTOMATED: CPT

## 2022-11-16 PROCEDURE — 6360000002 HC RX W HCPCS: Performed by: NURSE ANESTHETIST, CERTIFIED REGISTERED

## 2022-11-16 RX ORDER — SODIUM CHLORIDE 0.9 % (FLUSH) 0.9 %
5-40 SYRINGE (ML) INJECTION EVERY 12 HOURS SCHEDULED
Status: DISCONTINUED | OUTPATIENT
Start: 2022-11-16 | End: 2022-11-16 | Stop reason: HOSPADM

## 2022-11-16 RX ORDER — SODIUM CHLORIDE 0.9 % (FLUSH) 0.9 %
5-40 SYRINGE (ML) INJECTION PRN
Status: DISCONTINUED | OUTPATIENT
Start: 2022-11-16 | End: 2022-11-18 | Stop reason: HOSPADM

## 2022-11-16 RX ORDER — SODIUM CHLORIDE, SODIUM LACTATE, POTASSIUM CHLORIDE, CALCIUM CHLORIDE 600; 310; 30; 20 MG/100ML; MG/100ML; MG/100ML; MG/100ML
INJECTION, SOLUTION INTRAVENOUS CONTINUOUS
Status: DISCONTINUED | OUTPATIENT
Start: 2022-11-16 | End: 2022-11-16 | Stop reason: HOSPADM

## 2022-11-16 RX ORDER — SODIUM CHLORIDE, SODIUM LACTATE, POTASSIUM CHLORIDE, AND CALCIUM CHLORIDE .6; .31; .03; .02 G/100ML; G/100ML; G/100ML; G/100ML
500 INJECTION, SOLUTION INTRAVENOUS PRN
Status: DISCONTINUED | OUTPATIENT
Start: 2022-11-16 | End: 2022-11-16 | Stop reason: HOSPADM

## 2022-11-16 RX ORDER — ACETAMINOPHEN 325 MG/1
650 TABLET ORAL EVERY 4 HOURS PRN
Status: DISCONTINUED | OUTPATIENT
Start: 2022-11-16 | End: 2022-11-16 | Stop reason: HOSPADM

## 2022-11-16 RX ORDER — IBUPROFEN 800 MG/1
800 TABLET ORAL EVERY 8 HOURS
Status: DISCONTINUED | OUTPATIENT
Start: 2022-11-16 | End: 2022-11-18 | Stop reason: HOSPADM

## 2022-11-16 RX ORDER — METHYLERGONOVINE MALEATE 0.2 MG/ML
200 INJECTION INTRAVENOUS PRN
Status: DISCONTINUED | OUTPATIENT
Start: 2022-11-16 | End: 2022-11-18 | Stop reason: HOSPADM

## 2022-11-16 RX ORDER — ROPIVACAINE HYDROCHLORIDE 2 MG/ML
INJECTION, SOLUTION EPIDURAL; INFILTRATION; PERINEURAL PRN
Status: DISCONTINUED | OUTPATIENT
Start: 2022-11-16 | End: 2022-11-16 | Stop reason: SDUPTHER

## 2022-11-16 RX ORDER — SODIUM CHLORIDE 9 MG/ML
25 INJECTION, SOLUTION INTRAVENOUS PRN
Status: DISCONTINUED | OUTPATIENT
Start: 2022-11-16 | End: 2022-11-16 | Stop reason: HOSPADM

## 2022-11-16 RX ORDER — POLYETHYLENE GLYCOL 3350 17 G/17G
17 POWDER, FOR SOLUTION ORAL DAILY
Status: DISCONTINUED | OUTPATIENT
Start: 2022-11-16 | End: 2022-11-18 | Stop reason: HOSPADM

## 2022-11-16 RX ORDER — OXYCODONE HYDROCHLORIDE 5 MG/1
5 TABLET ORAL EVERY 4 HOURS PRN
Status: DISCONTINUED | OUTPATIENT
Start: 2022-11-16 | End: 2022-11-18 | Stop reason: HOSPADM

## 2022-11-16 RX ORDER — ONDANSETRON 8 MG/1
8 TABLET, ORALLY DISINTEGRATING ORAL EVERY 8 HOURS PRN
Status: DISCONTINUED | OUTPATIENT
Start: 2022-11-16 | End: 2022-11-18 | Stop reason: HOSPADM

## 2022-11-16 RX ORDER — ASPIRIN 81 MG/1
81 TABLET ORAL DAILY
Status: ON HOLD | COMMUNITY
End: 2022-11-18 | Stop reason: HOSPADM

## 2022-11-16 RX ORDER — ACETAMINOPHEN 500 MG
1000 TABLET ORAL EVERY 8 HOURS PRN
Status: DISCONTINUED | OUTPATIENT
Start: 2022-11-16 | End: 2022-11-18 | Stop reason: HOSPADM

## 2022-11-16 RX ORDER — SODIUM CHLORIDE, SODIUM LACTATE, POTASSIUM CHLORIDE, AND CALCIUM CHLORIDE .6; .31; .03; .02 G/100ML; G/100ML; G/100ML; G/100ML
1000 INJECTION, SOLUTION INTRAVENOUS PRN
Status: DISCONTINUED | OUTPATIENT
Start: 2022-11-16 | End: 2022-11-16 | Stop reason: HOSPADM

## 2022-11-16 RX ORDER — SODIUM CHLORIDE 9 MG/ML
INJECTION, SOLUTION INTRAVENOUS PRN
Status: DISCONTINUED | OUTPATIENT
Start: 2022-11-16 | End: 2022-11-18 | Stop reason: HOSPADM

## 2022-11-16 RX ORDER — SODIUM CHLORIDE 0.9 % (FLUSH) 0.9 %
5-40 SYRINGE (ML) INJECTION EVERY 12 HOURS SCHEDULED
Status: DISCONTINUED | OUTPATIENT
Start: 2022-11-16 | End: 2022-11-18 | Stop reason: HOSPADM

## 2022-11-16 RX ORDER — MISOPROSTOL 200 UG/1
200 TABLET ORAL PRN
Status: DISCONTINUED | OUTPATIENT
Start: 2022-11-16 | End: 2022-11-18 | Stop reason: HOSPADM

## 2022-11-16 RX ORDER — SODIUM CHLORIDE, SODIUM LACTATE, POTASSIUM CHLORIDE, CALCIUM CHLORIDE 600; 310; 30; 20 MG/100ML; MG/100ML; MG/100ML; MG/100ML
INJECTION, SOLUTION INTRAVENOUS CONTINUOUS
Status: DISCONTINUED | OUTPATIENT
Start: 2022-11-16 | End: 2022-11-18 | Stop reason: HOSPADM

## 2022-11-16 RX ORDER — SODIUM CHLORIDE 0.9 % (FLUSH) 0.9 %
5-40 SYRINGE (ML) INJECTION PRN
Status: DISCONTINUED | OUTPATIENT
Start: 2022-11-16 | End: 2022-11-16 | Stop reason: HOSPADM

## 2022-11-16 RX ORDER — TRANEXAMIC ACID 10 MG/ML
1000 INJECTION, SOLUTION INTRAVENOUS
Status: DISCONTINUED | OUTPATIENT
Start: 2022-11-16 | End: 2022-11-16 | Stop reason: HOSPADM

## 2022-11-16 RX ORDER — LANOLIN
CREAM (ML) TOPICAL PRN
Status: DISCONTINUED | OUTPATIENT
Start: 2022-11-16 | End: 2022-11-18 | Stop reason: HOSPADM

## 2022-11-16 RX ORDER — ONDANSETRON 2 MG/ML
4 INJECTION INTRAMUSCULAR; INTRAVENOUS EVERY 6 HOURS PRN
Status: DISCONTINUED | OUTPATIENT
Start: 2022-11-16 | End: 2022-11-16 | Stop reason: HOSPADM

## 2022-11-16 RX ADMIN — CEFAZOLIN 1000 MG: 1 INJECTION, POWDER, FOR SOLUTION INTRAMUSCULAR; INTRAVENOUS at 12:09

## 2022-11-16 RX ADMIN — ROPIVACAINE HYDROCHLORIDE 8 ML/HR: 2 INJECTION, SOLUTION EPIDURAL; INFILTRATION; PERINEURAL at 13:38

## 2022-11-16 RX ADMIN — SODIUM CHLORIDE, SODIUM LACTATE, POTASSIUM CHLORIDE, AND CALCIUM CHLORIDE: 600; 310; 30; 20 INJECTION, SOLUTION INTRAVENOUS at 02:55

## 2022-11-16 RX ADMIN — IBUPROFEN 800 MG: 800 TABLET, FILM COATED ORAL at 21:42

## 2022-11-16 RX ADMIN — Medication 1 MILLI-UNITS/MIN: at 10:42

## 2022-11-16 RX ADMIN — SODIUM CHLORIDE, SODIUM LACTATE, POTASSIUM CHLORIDE, AND CALCIUM CHLORIDE: 600; 310; 30; 20 INJECTION, SOLUTION INTRAVENOUS at 07:17

## 2022-11-16 RX ADMIN — ROPIVACAINE HYDROCHLORIDE 8 ML: 2 INJECTION, SOLUTION EPIDURAL; INFILTRATION; PERINEURAL at 13:36

## 2022-11-16 RX ADMIN — CEFAZOLIN SODIUM 2000 MG: 100 INJECTION, POWDER, LYOPHILIZED, FOR SOLUTION INTRAVENOUS at 02:57

## 2022-11-16 NOTE — PROGRESS NOTES
Srom around MN, clear fluid  Cx 3 at 0545  Not on pit  Can't assess strip- pt on ball  Reactive w/o decels when tracing  Ctx's 3-6min apart.   Start pit

## 2022-11-16 NOTE — PROGRESS NOTES
SBAR OUT Report: Mother    Verbal report given to TU Fernandez RN (full name & credentials) on this patient, who is now being transferred to MIU (unit) for routine progression of patient care. The patient is not wearing a green \"Anesthesia-Duramorph\" band. Report consisted of patient's Situation, Background, Assessment and Recommendations (SBAR).  ID bands were compared with the identification form, and verified with the patient and receiving nurse. Information from the Nurse Handoff Report, Intake/Output, and MAR and the [place] Report was reviewed with the receiving nurse; opportunity for questions and clarification provided.

## 2022-11-16 NOTE — H&P
Obstetrics History & Physical    Name: Akira Gaitan  Date: 2022 2:21 AM  MRN#: [id number]  : 1990  Age/Sex: 29 y. o.female  Admission Date: 2022  Admitting Provider: Yissel Nix MD    HPI: Akira Gaitan is a 29 y.o.  female with Estimated Date of Delivery: 22 at 36w0d gestation who presents for possible spontaneous rupture of membranes. Her current obstetrical history is significant for gestational diabetes diet-controlled. Prenatal records reviewed. Large gush of fluid shortly after midnight with continuing gushes since. She reports good fetal movement. She denies vaginal bleeding. She reports regular contractions which started just as she was arriving at the hospital.     Past History:  Obstetrics History:  OB History    Para Term  AB Living   2       1     SAB IAB Ectopic Molar Multiple Live Births     1              # Outcome Date GA Lbr Jeffry/2nd Weight Sex Delivery Anes PTL Lv   2 Current            1 IAB                Medical History:  Past Medical History:   Diagnosis Date    Constipation in pregnancy, second trimester 22 at [facility]: Patient counseled on stool softener for constipation. 22 at [facility]: Reports improvement  RESOLVED    Nausea and vomiting in pregnancy 22 at [facility]: Patient states B6 is not working well. Pt states she has zofran at home. Pt counseled on using stool softener with Zofran.  22 at [facility]: Improvement  RESOLVED     Past Surgical History:   Procedure Laterality Date    WISDOM TOOTH EXTRACTION   &      Social History     Tobacco Use    Smoking status: Never    Smokeless tobacco: Never   Substance Use Topics    Alcohol use: Not Currently     Family History   Problem Relation Age of Onset    Diabetes Maternal Grandfather      Prior to Admission medications    Medication Sig Start Date End Date Taking?  Authorizing Provider   aspirin 81 MG EC tablet Take 81 mg by mouth daily   Yes Historical Provider, MD   glucose monitoring (FREESTYLE FREEDOM) kit Use as directed to check sugars fasting and 1 hour after the first bite of each meal 4x daily (breakfast, lunch, dinner) 10/6/22   Claudette Eke, MD   blood glucose monitor strips Use as directed to check sugars fasting and 1 hour after the first bite of each meal 4x daily (breakfast, lunch, dinner). Dispense sufficient amount for indicated testing frequency plus additional to accommodate PRN testing needs.  10/6/22   Claudette Eke, MD   Lancets MISC 1 each by Does not apply route daily Use as directed to check sugars fasting and 1 hour after the first bite of each meal 4x daily (breakfast, lunch, dinner) 10/6/22   Claudette Eke, MD   calcium carbonate (OSCAL) 500 MG TABS tablet Take 500 mg by mouth daily    Historical Provider, MD   Cholecalciferol (VITAMIN D3) 50 MCG (2000 UT) CAPS Take by mouth    Historical Provider, MD   ferrous sulfate (IRON 325) 325 (65 Fe) MG tablet Take 325 mg by mouth daily (with breakfast)  Patient not taking: Reported on 11/16/2022    Amara Harris MD   acetaminophen (TYLENOL) 500 MG tablet Take 500 mg by mouth as needed for Pain  Patient not taking: Reported on 11/16/2022    Historical Provider, MD   Prenatal MV & Min w/FA-DHA (ONE A DAY PRENATAL PO) Take by mouth    Historical Provider, MD       Current Facility-Administered Medications:     lactated ringers infusion, , IntraVENous, Continuous, Patience Burgos MD    lactated ringers bolus, 500 mL, IntraVENous, PRN **OR** lactated ringers bolus, 1,000 mL, IntraVENous, PRN, Patience Burgos MD    sodium chloride flush 0.9 % injection 5-40 mL, 5-40 mL, IntraVENous, 2 times per day, Patience Burgos MD    sodium chloride flush 0.9 % injection 5-40 mL, 5-40 mL, IntraVENous, PRN, Patience Burgos MD    0.9 % sodium chloride infusion, 25 mL, IntraVENous, PRN, Patience Burgos MD    butorphanol (STADOL) injection 1 mg, 1 mg, IntraVENous, Q3H PRN, Patience Burgos MD    ondansetron [facility]) injection 4 mg, 4 mg, IntraVENous, Q6H PRN, Alize Latif MD    tranexamic acid-NaCl IVPB premix 1,000 mg, 1,000 mg, IntraVENous, Once PRN, Alize Latif MD    acetaminophen (TYLENOL) tablet 650 mg, 650 mg, Oral, Q4H PRN, Alize Latif MD    benzocaine-menthol (DERMOPLAST) 20-0.5 % spray, , Topical, PRN, Alize Latif MD    ceFAZolin (ANCEF) 2000 mg in sterile water 20 mL IV syringe, 2,000 mg, IntraVENous, Once **FOLLOWED BY** ceFAZolin (ANCEF) 1,000 mg in sodium chloride 0.9 % 50 mL IVPB (mini-bag), 1,000 mg, IntraVENous, Q8H, Alize Latif MD  Allergies   Allergen Reactions    Amoxicillin Hives    Amoxicillin-Pot Clavulanate Hives       Physical Exam:  VITALS:  BP (!) 142/78   Pulse 68   Temp 98.5 °F (36.9 °C) (Oral)   Resp 18   LMP 03/09/2022   SpO2 100%     CONSTITUTIONAL:  mild distress with contractions  FHTs: Reactive and reassuring and Category I strip  Membranes: spontaneously ruptured fluid is clear and AmniSure positive  Cervix: 2-3 cm dilated, 70% effaced, -3 station  Uterine activity/Contractions on monitor: Regular, every 4 minutes  Presentation: cephalic    Labs: Review & Summary  Prenatal:  Lab Results   Component Value Date/Time    HGB 11.6 (L) 09/12/2022 04:42 PM    HCT 33.6 (L) 09/12/2022 04:42 PM     GBS was just done at office today and is not back yet. Will send rapid GBS. Assessment:  36w0d gestation  SROM; Early labor  Obstetrical history significant for gestational diabetes diet-controlled.     Plan: admit for labor and antibiotics for GBS prophylaxis    Discussed with: Dr. Quiana Babb

## 2022-11-16 NOTE — PROGRESS NOTES
Grant Dennis at bedside at 1320. DERICK Acuña at bedside at 1320    Assisted pt to sitting up on bedside at 1320. Timeout completed at [phone] with MD, DERICK and myself at bedside. Test dose given at 1331. Negative reaction. Dose given at 1337. Pt assisted to lying back in left tilt position. See anesthesia record for details. See vital sign flow sheet for BP. Tolerated procedure well.

## 2022-11-16 NOTE — L&D DELIVERY NOTE
Mother's Information      Labor Events     Labor?: Yes  Cervical Ripening:   Now               Ziarachelle Haney [[id number]]      Labor Events     Labor?: Yes   Steroids?: None  Cervical Ripening Date/Time:     Antibiotics Received during Labor?: Yes  Rupture Date/Time: 22 00:45:00   Rupture Type: SROM  Fluid Color: Clear  Fluid Odor: None  Fluid Volume: Moderate  Induction: None  Augmentation: Oxytocin  Labor Complications: None       Anesthesia    Method: Epidural       Start Pushing      Labor onset date/time: 22 00:45:00 Now     Dilation complete date/time:   Now     Start pushing date/time:    Decision date/time (emergent ):           Labor Length    3rd stage: 0h 06m       Delivery ()      Delivery Date/Time:  22 16:19:00   Delivery Type: Vaginal, Spontaneous    Details:            [place] Presentation    Presentation: Vertex  _: Occiput  _: Anterior       Shoulder Dystocia    Shoulder Dystocia Present?: No  Add Second Maneuver  Add Third Maneuver  Add Fourth Maneuver  Add Fifth Maneuver  Add Sixth Maneuver  Add Seventh Maneuver  Add Eighth Maneuver  Add Ninth Maneuver       Assisted Delivery Details    Forceps Attempted?: No  Vacuum Extractor Attempted?: No       Document Additional Attempt         Document Additional Attempt                 Cord    Vessels: 3 Vessels  Complications: None  Delayed Cord Clamping?: Yes  Cord Blood Disposition: Lab  Gases Sent?: No       Placenta    Date/Time: 2022 16:25:00  Removal: Expressed  Appearance: Intact  Disposition: Discarded       Lacerations    Episiotomy: None  Perineal Lacerations: None  Other Lacerations: labial laceration  Labial Laceration: bilateral Repaired?: No          Vaginal Counts    Initial Count Personnel: JOSEPH MATHIS  Initial Count Verified By: CLAUDIA VILLA RN    Sponges [place]   Initial Counts Correct Correct Correct   Final Counts Correct Correct Correct Final Count Personnel: ST SARTHAK  Final Count Verified By: Angel VILLA RN  Accurate Final Count?: Yes  If the count is incorrect due to Intentionally Retained Foreign Object (IRFO) add the IRFO LDA in Lines/Drains. Add LDA: Link to [facility]       Blood Loss  Mother: Aniceto Cao #[id number]     Start of Mother's Information      Delivery Blood Loss  22 0045 - 22 1702      None                 End of Mother's Information  Mother: Aniceto Cao #[id number]                Delivery Providers    Delivering clinician: Jennifer Montes MD     Provider Role    Jennifer Montes MD Obstetrician    Wade Conner, RN Primary Nurse    Socorro Naylor, RN Primary [place] Nurse    Jassi Bolanos, RONAN Charge Nurse    Dhara Carranza MD Neonatologist    Jewel Guo, TYLERP Respiratory Therapist (Day)              [place] Assessment    Living Status: Living  Delivery Location Comment: [phone]     Apgar Scoring Key:    0 1 2    Skin Color: Blue or pale Acrocyanotic Completely pink    Heart Rate: Absent <100 bpm >100 bpm    Reflex Irritability: No response Grimace Cry or active withdrawal    Muscle Tone: Limp Some flexion Active motion    Respiratory Effort: Absent Weak cry; hypoventilation Good, crying                      Skin Color:   Heart Rate:   Reflex Irritability:   Muscle Tone:   Respiratory Effort: Total:            1 Minute:    1    2    1    2    2    8        Apgar 1 total from OB History    5 Minute:    1    2    2    2    2    9        Apgar 5 total from OB History    10 Minute:              15 Minute:              20 Minute:                        Apgars Assigned By: DR. STOUT              Resuscitation    Method: Bulb Suction, Stimulation              Measurements      Birth Weight: 2480 g Birth Length: 0.48 m     Head Circumference: 0.32 m Chest Circumference: 0.29 m              Title      Skin to Skin Initiation Date/Time:       Skin to Skin End Date/Time: 1st baby. Pt pushed well. Small baby. 5-7 female. Suddenly precipitously delivered into the bed. Baby out, crying, being bulb sxn'd on my arrival.   Placenta expressed intact. Small bilat labial tear, not bldg, not repaired. Neonatology came for PTD. Baby doing well.

## 2022-11-16 NOTE — ANESTHESIA PROCEDURE NOTES
Epidural Block    Patient location during procedure: OB  Start time: 11/16/2022 1:25 PM  End time: 11/16/2022 1:30 PM  Reason for block: labor epidural  Staffing  Performed: anesthesiologist   Anesthesiologist: Rosario Taylor MD  Epidural  Patient position: sitting  Prep: ChloraPrep  Patient monitoring: continuous pulse ox and frequent blood pressure checks  Approach: right paramedian  Location: L2-3  Injection technique: JH air  Provider prep: mask and sterile gloves  Needle  Needle type: Tuohy   Needle gauge: 17 G  Epidural needle length (in): 10 cm. Needle insertion depth: 4 cm  Catheter type: end hole  Catheter size: 19 G  Catheter at skin depth: 9 cm  Test dose: negativeCatheter Secured: tegaderm and tape (liquid adhesive)  Assessment  Hemodynamics: stable  Attempts: 1  Outcomes: patient tolerated procedure well and uncomplicated  Additional Notes  3 cc 1% lidocaine local at needle insertion site.       Timeout 1323  Preanesthetic Checklist  Completed: patient identified, IV checked, site marked, risks and benefits discussed, equipment checked, pre-op evaluation, timeout performed, anesthesia consent given, oxygen available and monitors applied/VS acknowledged

## 2022-11-16 NOTE — ANESTHESIA PRE PROCEDURE
Department of Anesthesiology  Preprocedure Note       Name:  Tammy Abreu   Age:  29 y.o.  :  1990                                          MRN:  [id number]         Date:  2022      Surgeon: * No surgeons listed *    Procedure: * No procedures listed *    Medications prior to admission:   Prior to Admission medications    Medication Sig Start Date End Date Taking? Authorizing Provider   aspirin 81 MG EC tablet Take 81 mg by mouth daily   Yes Historical MD Steven   glucose monitoring (FREESTYLE FREEDOM) kit Use as directed to check sugars fasting and 1 hour after the first bite of each meal 4x daily (breakfast, lunch, dinner) 10/6/22   Gely Bagley MD   blood glucose monitor strips Use as directed to check sugars fasting and 1 hour after the first bite of each meal 4x daily (breakfast, lunch, dinner). Dispense sufficient amount for indicated testing frequency plus additional to accommodate PRN testing needs.  10/6/22   Gely Bagley MD   Lancets MISC 1 each by Does not apply route daily Use as directed to check sugars fasting and 1 hour after the first bite of each meal 4x daily (breakfast, lunch, dinner) 10/6/22   Gely Bagley MD   calcium carbonate (OSCAL) 500 MG TABS tablet Take 500 mg by mouth daily    Historical Provider, MD   Cholecalciferol (VITAMIN D3) 50 MCG (2000 UT) CAPS Take by mouth    Historical Provider, MD   ferrous sulfate (IRON 325) 325 (65 Fe) MG tablet Take 325 mg by mouth daily (with breakfast)  Patient not taking: Reported on 2022    Amara Harris MD   acetaminophen (TYLENOL) 500 MG tablet Take 500 mg by mouth as needed for Pain  Patient not taking: Reported on 2022    Historical Provider, MD   Prenatal MV & Min w/FA-DHA (ONE A DAY PRENATAL PO) Take by mouth    Historical Provider, MD       Current medications:    Current Facility-Administered Medications   Medication Dose Route Frequency Provider Last Rate Last Admin    lactated ringers infusion   IntraVENous Continuous Ines Villarreal  mL/hr at 11/16/22 0717 New Bag at 11/16/22 0717    lactated ringers bolus  500 mL IntraVENous PRN Ines Villarreal MD        Or    lactated ringers bolus  1,000 mL IntraVENous PRN Ines Villarreal MD        sodium chloride flush 0.9 % injection 5-40 mL  5-40 mL IntraVENous 2 times per day nIes Villarreal MD        sodium chloride flush 0.9 % injection 5-40 mL  5-40 mL IntraVENous PRN Ines Villarreal MD        0.9 % sodium chloride infusion  25 mL IntraVENous PRN Ines Villarreal MD        butorphanol (STADOL) injection 1 mg  1 mg IntraVENous Q3H PRN Ines Villarreal MD        ondansetron [facility]) injection 4 mg  4 mg IntraVENous Q6H PRN Ines Villarreal MD        tranexamic acid-NaCl IVPB premix 1,000 mg  1,000 mg IntraVENous Once PRN Ines Villarreal MD        acetaminophen (TYLENOL) tablet 650 mg  650 mg Oral Q4H PRN Ines Villarreal MD        benzocaine-menthol (DERMOPLAST) 20-0.5 % spray   Topical PRN Ines Villarreal MD        ceFAZolin (ANCEF) 1,000 mg in sodium chloride 0.9 % 50 mL IVPB (mini-bag)  1,000 mg IntraVENous Q8H Ines Villarreal MD   Stopped at 11/16/22 1239    oxytocin (PITOCIN) 30 units in 500 mL infusion  1-20 linwood-units/min IntraVENous Continuous Divnia Rodriguez MD   Stopped at 11/16/22 1813     Facility-Administered Medications Ordered in Other Encounters   Medication Dose Route Frequency Provider Last Rate Last Admin    ropivacaine (NAROPIN) 0.2% injection 0.2%   Epidural PRN MARCIN Galeano - CRNA   8 mL/hr at 11/16/22 1338       Allergies:     Allergies   Allergen Reactions    Amoxicillin Hives    Amoxicillin-Pot Clavulanate Hives       Problem List:    Patient Active Problem List   Diagnosis Code    Ovarian cyst affecting pregnancy in second trimester, antepartum O34.82, N83.209    [facility] O99.019, D57.3    High-risk pregnancy in second trimester O09.92    Renal disease during pregnancy in second trimester O26.832    Gestational diabetes mellitus (GDM) in third trimester O24.419    Normal labor O80, Z37.9     premature rupture of membranes (PPROM) with unknown onset of labor O37.36    Vaginal delivery O80       Past Medical History:        Diagnosis Date    Constipation in pregnancy, second trimester 22 at [facility]: Patient counseled on stool softener for constipation. 22 at [facility]: Reports improvement  RESOLVED    Nausea and vomiting in pregnancy 22 at [facility]: Patient states B6 is not working well. Pt states she has zofran at home.  Pt counseled on using stool softener with Zofran.  22 at [facility]: Improvement  RESOLVED       Past Surgical History:        Procedure Laterality Date    WISDOM TOOTH EXTRACTION   &        Social History:    Social History     Tobacco Use    Smoking status: Never    Smokeless tobacco: Never   Substance Use Topics    Alcohol use: Not Currently                                Counseling given: Not Answered      Vital Signs (Current):   Vitals:    22 1722 22 1737 22 1752 22 1807   BP: 133/61 120/65 114/63 128/80   Pulse: 74 63 85 67   Resp:       Temp:       TempSrc:       SpO2:                                                  BP Readings from Last 3 Encounters:   22 128/80   22 120/78   22 112/74       NPO Status:                                                                                 BMI:   Wt Readings from Last 3 Encounters:   22 161 lb (73 kg)   22 158 lb (71.7 kg)   10/23/22 159 lb (72.1 kg)     There is no height or weight on file to calculate BMI.    CBC:   Lab Results   Component Value Date/Time    WBC 5.9 2022 02:48 AM    RBC 3.64 2022 02:48 AM    HGB 12.0 2022 02:48 AM    HCT 34.9 2022 02:48 AM    MCV 95.9 2022 02:48 AM    RDW 13.0 2022 02:48 AM     2022 02:48 AM       CMP: No results found for: NA, K, CL, CO2, BUN, CREATININE, GFRAA, AGRATIO, LABGLOM, GLUCOSE, GLU, PROT, CALCIUM, BILITOT, ALKPHOS, AST, ALT    POC Tests:   Recent Labs     11/16/22  0250   POCGLU 81       Coags: No results found for: PROTIME, INR, APTT    HCG (If Applicable): No results found for: PREGTESTUR, PREGSERUM, HCG, HCGQUANT     ABGs: No results found for: PHART, PO2ART, VTB3ZSK, HXS7IOU, BEART, J8CBCHOX     Type & Screen (If Applicable):  No results found for: LABABO, LABRH    Drug/Infectious Status (If Applicable):  No results found for: HIV, HEPCAB    COVID-19 Screening (If Applicable):   Lab Results   Component Value Date/Time    COVID19 Not detected 10/23/2022 06:04 PM           Anesthesia Evaluation  Patient summary reviewed and Nursing notes reviewed  Airway: Mallampati: I  TM distance: >3 FB   Neck ROM: full  Mouth opening: > = 3 FB   Dental: normal exam         Pulmonary:Negative Pulmonary ROS breath sounds clear to auscultation                             Cardiovascular:  Exercise tolerance: good (>4 METS),           Rhythm: regular  Rate: normal                    Neuro/Psych:   Negative Neuro/Psych ROS              GI/Hepatic/Renal:             Endo/Other:    (+) Diabetes (gestational), . Abdominal:             Vascular: negative vascular ROS. Other Findings:           Anesthesia Plan      epidural     ASA 2             Anesthetic plan and risks discussed with patient and spouse.                         Katty Roberts MD   11/16/2022

## 2022-11-17 PROCEDURE — 1100000000 HC RM PRIVATE

## 2022-11-17 PROCEDURE — 6370000000 HC RX 637 (ALT 250 FOR IP): Performed by: OBSTETRICS & GYNECOLOGY

## 2022-11-17 RX ADMIN — IBUPROFEN 800 MG: 800 TABLET, FILM COATED ORAL at 09:30

## 2022-11-17 RX ADMIN — IBUPROFEN 800 MG: 800 TABLET, FILM COATED ORAL at 23:31

## 2022-11-17 RX ADMIN — ACETAMINOPHEN 1000 MG: 500 TABLET, FILM COATED ORAL at 13:53

## 2022-11-17 RX ADMIN — POLYETHYLENE GLYCOL 3350 17 G: 17 POWDER, FOR SOLUTION ORAL at 09:30

## 2022-11-17 ASSESSMENT — PAIN SCALES - GENERAL
PAINLEVEL_OUTOF10: 3
PAINLEVEL_OUTOF10: 3

## 2022-11-17 ASSESSMENT — PAIN DESCRIPTION - ONSET: ONSET: GRADUAL

## 2022-11-17 ASSESSMENT — PAIN - FUNCTIONAL ASSESSMENT: PAIN_FUNCTIONAL_ASSESSMENT: ACTIVITIES ARE NOT PREVENTED

## 2022-11-17 ASSESSMENT — PAIN DESCRIPTION - ORIENTATION: ORIENTATION: LOWER

## 2022-11-17 ASSESSMENT — PAIN DESCRIPTION - LOCATION: LOCATION: ABDOMEN;PERINEUM

## 2022-11-17 ASSESSMENT — PAIN DESCRIPTION - PAIN TYPE: TYPE: ACUTE PAIN

## 2022-11-17 ASSESSMENT — PAIN DESCRIPTION - FREQUENCY: FREQUENCY: INTERMITTENT

## 2022-11-17 ASSESSMENT — PAIN DESCRIPTION - DESCRIPTORS: DESCRIPTORS: ACHING;CRAMPING

## 2022-11-17 NOTE — PROGRESS NOTES
Progress Note                               Patient: Cherie Thompson MRN: [id number]  SSN: [id number]    YOB: 1990  Age: 29 y.o. Sex: female      Postpartum Day Number 1    Subjective:     Patient doing well without significant complaints. Ambulating, voiding without difficulty Patient reports normal lochia. Objective:     Patient Vitals for the past 18 hrs:   Temp Pulse Resp BP SpO2   22 0814 98.1 °F (36.7 °C) 86 16 115/83 99 %   22 2325 98 °F (36.7 °C) 92 16 116/78 95 %        Temp (24hrs), Av.1 °F (36.7 °C), Min:98 °F (36.7 °C), Max:98.1 °F (36.7 °C)      Physical Exam:    GEN: NAD, AAOx3  HEENT: NCAT  CV: Regular rate  PUL: Normal respiratory effort      Lab/Data Review: All lab results for the last 24 hours reviewed. Assessment and Plan:     Patient appears to be having an uncomplicated postpartum course. Continue routine perineal care and maternal education.     Shawn Salinas MD

## 2022-11-17 NOTE — LACTATION NOTE
This note was copied from a baby's chart. In to see mom and infant for the first time. Mom was getting ready to attempt to latch infant. Assisted mom with attempt to latch infant on her left breast in football hold. Infant latched and started to suck rhythmically . Showed mom how to position infant to get and maintain a deeper latch. Infant nursed for several minutes and came off content. Mom then burped infant and placed her to her right breast in the cross cradle hold. Infant latched and again sucked rhythmically for 30 minutes. Reviewed with mom the first 25 and the second night of life ans answered mom's questions. Lactation consultant will follow up tomorrow.

## 2022-11-17 NOTE — CARE COORDINATION
Chart reviewed - first time parent. SW met with patient to complete initial assessment.  provided education on [facility] Postpartum [place] Home Visit. Family would like to participate in program.  Referral will be made at discharge. Patient given informational packet on  mood & anxiety disorders (resources/education). Family denies any additional needs from  at this time. Family has 's contact information should any needs/questions arise.     MARCELA Rachel, [address] [facility]   [phone]

## 2022-11-17 NOTE — ANESTHESIA POSTPROCEDURE EVALUATION
Patient: Александр Josue  MRN: [id number]  YOB: 1990  Date of evaluation: 11/17/2022      Procedure Summary     Date: 11/16/22 Room / Location:     Anesthesia Start: 5692 Anesthesia Stop: 1619    Procedure: Labor Analgesia Diagnosis:     Scheduled Providers:  Responsible Provider: Carmelo George MD    Anesthesia Type: epidural ASA Status: 2        Anesthesiology Epidural Followup Note    S/p vaginal delivery via continuous labor epidural.  Patient had adequate pain control during labor and delivery, and she is currently without complaints. No residual motor or sensory deficit. No apparent anesthetic complications.

## 2022-11-18 VITALS
RESPIRATION RATE: 16 BRPM | SYSTOLIC BLOOD PRESSURE: 122 MMHG | DIASTOLIC BLOOD PRESSURE: 81 MMHG | OXYGEN SATURATION: 99 % | HEART RATE: 61 BPM | TEMPERATURE: 98 F

## 2022-11-18 LAB
BACTERIA SPEC CULT: NORMAL
SERVICE CMNT-IMP: NORMAL

## 2022-11-18 PROCEDURE — 6370000000 HC RX 637 (ALT 250 FOR IP): Performed by: OBSTETRICS & GYNECOLOGY

## 2022-11-18 RX ORDER — IBUPROFEN 600 MG/1
600 TABLET ORAL EVERY 6 HOURS PRN
Qty: 60 TABLET | Refills: 0 | Status: SHIPPED | OUTPATIENT
Start: 2022-11-18

## 2022-11-18 RX ADMIN — ACETAMINOPHEN 1000 MG: 500 TABLET, FILM COATED ORAL at 04:24

## 2022-11-18 RX ADMIN — POLYETHYLENE GLYCOL 3350 17 G: 17 POWDER, FOR SOLUTION ORAL at 08:54

## 2022-11-18 RX ADMIN — IBUPROFEN 800 MG: 800 TABLET, FILM COATED ORAL at 08:54

## 2022-11-18 RX ADMIN — ACETAMINOPHEN 1000 MG: 500 TABLET, FILM COATED ORAL at 15:00

## 2022-11-18 ASSESSMENT — PAIN SCALES - GENERAL
PAINLEVEL_OUTOF10: 2
PAINLEVEL_OUTOF10: 2

## 2022-11-18 ASSESSMENT — PAIN DESCRIPTION - DESCRIPTORS: DESCRIPTORS: ACHING;CRAMPING

## 2022-11-18 ASSESSMENT — PAIN DESCRIPTION - ONSET: ONSET: GRADUAL

## 2022-11-18 ASSESSMENT — PAIN DESCRIPTION - FREQUENCY: FREQUENCY: INTERMITTENT

## 2022-11-18 ASSESSMENT — PAIN - FUNCTIONAL ASSESSMENT: PAIN_FUNCTIONAL_ASSESSMENT: ACTIVITIES ARE NOT PREVENTED

## 2022-11-18 ASSESSMENT — PAIN DESCRIPTION - PAIN TYPE: TYPE: ACUTE PAIN

## 2022-11-18 ASSESSMENT — PAIN DESCRIPTION - ORIENTATION: ORIENTATION: LOWER

## 2022-11-18 ASSESSMENT — PAIN DESCRIPTION - LOCATION: LOCATION: ABDOMEN;PERINEUM

## 2022-11-18 NOTE — LACTATION NOTE
This note was copied from a baby's chart. Baby in [address] [place] [address] for carseat test.  Last ate around 8 am.  Mom reports feedings going well. Plan to assist at 11a feeding after back from carseat test.  Reviewed late  expectations and gave mom feeding plan to use as needed once home. Discussed insurance pumping and encouraged frequent feeding. Watch output. Call as needed.

## 2022-11-18 NOTE — CARE COORDINATION
Referral made to [facility]  home visit program.     MARCELA Ferrer, [address] [facility]   [phone]

## 2022-11-18 NOTE — LACTATION NOTE
This note was copied from a baby's chart. Mom called out for assistance. Baby has still not latched since 8a. With effort was able to get baby on in football on L. Once on deep enough baby did great and nursed well 20 minutes. Discussed once home if baby does not feed and it is past 3 hours, mom will need to use the feeding plan and pump. Demoed use of curved tip syringe. Discussed keeping baby warm while also trying to keep awake for feedings. Noted planning for Ped visit tomorrow.

## 2022-11-18 NOTE — LACTATION NOTE
This note was copied from a baby's chart. In after feeding delayed due to photography. Baby was inconsolable. Attempted to latch in football on L and cross cradle on R.  Baby either crying or refusing. Suggested mom do skin to skin to help calm and let baby exhibit feeding cues once settled. Use printed feeding plan if baby continues to refuse to latch. Call as needed. RN updated.

## 2022-11-18 NOTE — LACTATION NOTE
This note was copied from a baby's chart. Individualized Feeding Plan for Breastfeeding   Lactation Services [phone]    As much as possible, hold your baby on your chest so babys bare skin is against your bare skin with a blanket covering babys back, especially 30 minutes before it is time for baby to eat. Watch for early feeding cues such as, licking lips, sucking motions, rooting, hands to mouth. Crying is a late feeding cue. Feed your baby at least 8 times in 24 hours, or more if your baby is showing feeding cues. If baby is sleepy put baby skin to skin and watch for hunger cues. To rouse baby: unwrap, undress, massage hands, feet, & back, change diaper, gently change babys position from lying to sitting. 15-20 minutes on the first breast of active breastfeeding is considered a good feeding. Good, active breastfeeding is when baby is alert, tugging the nipple, their ear may move, and you can hear swallows. Allow baby to finish the first side before changing sides. Sleeping at the breast or only brief, light sucks should not be considered a good, full breastfeed. At each feeding:  __x__1. Do Suck Practice on finger before each feeding until sucking pattern is smooth. Try using index finger. Nail down towards tongue. __x__2. Hand Express for a few minutes prior to latching to help start milk flow. __x__3. Baby needs to NURSE WELL x 15-20 minutes on at least first breast, burp and offer 2nd breast at every feeding. If no sustained latch only attempt at breast for 10 minutes. If baby does not latch on and feed well on at least one side, you should:   __x__4. Double pump for 15 minutes with breast massage and compression. Hand express for an additional 2-3 minutes per side. Pump after each feeding attempt or poor feeding, up to 8 times per day. If you are not putting baby to the breast you need to pump 8 times a day. Pump every 3 hours. __x__5.  Give baby all of the breast milk you obtain using a straight syringe or  curved syringe. If baby does NOT have enough wet and dirty diapers per day, is jaundiced/lethargic, or has significant weight loss AND you do NOT pump enough milk for each feeding (per volume listed below), formula supplementation may need to be used. Call lactation department /pediatrician if you have concerns. AVERAGE INTAKES OF COLOSTRUM BY HEALTHY  INFANTS:  Time  Day Intake (ml/feed)  1st 24 hrs  1 2-10 ml  24-48 hrs  2 5-15 ml  48-72 hrs  3 15-30 ml (0.5-1 oz)  72-96 hrs  4 30+ ml (1+ oz)                          5-6       45+ ml (1.5+ oz)                           7          up to 60 ml  (2 oz)      By day 7, baby will need 60 ml or 2 oz at each feeding based on 8 feedings per day & babys weight. (1oz = 30ml). Total milk volume needed in 24 hours by Day 7 is 15 oz per day based on baby's birthweight of 5-8. The more often baby eats, the less volume they need per feeding. If baby is eating more often than the minimum of 8 times per day, they may take less per feeding. Comments: Use plan as needed. May want to insurance pump. Encouraged to double pump after most daytime feedings for 10 minutes to help milk come in.  Give all colostrum in a straight or curved tip syringe. If pumping, suggest using olive oil or coconut oil on your nipples before pumping to help reduce the friction. Use feeding plan until follow up with pediatrician. Continue to attempt at the breast for most feeds. Pump every 3 hours if no latch. Give all pumped colostrum/breastmilk at each feeding. OUTPATIENT APPOINTMENT Suggested. Outpatient services are located on the 4th floor at [address] [place] [address]. Check in at the 4th floor registration desk (the same one you used when you came to have your baby).   Call for questions [phone]

## 2022-11-18 NOTE — PROGRESS NOTES
[address], [place] [address], [address]  [phone]    Lashanda Spencer MD, Toni Posey FORTUNATO-BC  Alex Preston MD, [address] [facility]    Patient is S/P vaginal delivery at 36 weeks, PPROM/PTL. No complaints today. Lochia < menses. No GI/ issues. No F/C. VITALS  Patient Vitals for the past 24 hrs:   BP Temp Temp src Pulse Resp SpO2   22 0735 122/81 98 °F (36.7 °C) Oral 61 16 99 %   22 2331 120/80 98 °F (36.7 °C) Oral 65 16 99 %   22 1608 116/81 98.1 °F (36.7 °C) Oral 67 17 99 %        CV - RRR  LUNGS - CTA bilaterally  ABD - soft, approp tend, FF below umbilicus  EXT - tr edema bilaterally          Labs:  No results found for this or any previous visit (from the past 24 hour(s)). PPD #2      Pt is breast feeding. No issues or complaints today. Stable.   Routine PP care      Lashanda Spencer MD  11:49 AM  22

## 2022-11-19 LAB
BACTERIA SPEC CULT: NORMAL
SERVICE CMNT-IMP: NORMAL

## 2022-11-21 NOTE — DISCHARGE SUMMARY
[facility]  [address], [place] [address], [address]  [phone]    Keila Wang MD, [facility]  Chelsie Luther MD, Early Fernando  Date of Admission:  2022  1:53 AM  Date of Discharge:  2022  4:00 PM    Patient Active Problem List   Diagnosis    Ovarian cyst affecting pregnancy in second trimester, antepartum    Sickle cell trait in mother affecting pregnancy [facility])    High-risk pregnancy in second trimester    Renal disease during pregnancy in second trimester    Gestational diabetes mellitus (GDM) in third trimester    Normal labor     premature rupture of membranes (PPROM) with unknown onset of labor    Vaginal delivery        Charolett Reason 29 y.o. [id number] presented at 36w0d with PPROM/PTL  Pt had  without incident. See delivery note for all delivery details. Pt's PP course was uneventful. On day of D/C, she was ambulating well, afebrile, with lochia < menses. She was discharged home with medications as below. Pt was breast feeding on discharge. Routine PP instructions given to patient.   She is to follow up with [facility] in 6 weeks for PP exam.       Medication List        START taking these medications      ibuprofen 600 MG tablet  Commonly known as: ADVIL;MOTRIN  Take 1 tablet by mouth every 6 hours as needed for Pain            CONTINUE taking these medications      glucose monitoring kit  Use as directed to check sugars fasting and 1 hour after the first bite of each meal 4x daily (breakfast, lunch, dinner)     Lancets Misc  1 each by Does not apply route daily Use as directed to check sugars fasting and 1 hour after the first bite of each meal 4x daily (breakfast, lunch, dinner)     ONE A DAY PRENATAL PO            STOP taking these medications      acetaminophen 500 MG tablet  Commonly known as: TYLENOL     aspirin 81 MG EC tablet     blood glucose test strips     calcium carbonate 500 MG Tabs tablet  Commonly known as: OSCAL     Vitamin D3 50 MCG ( UT) Caps            ASK your doctor about these medications      ferrous sulfate 325 (65 Fe) MG tablet  Commonly known as: IRON 325               Where to Get Your Medications        These medications were sent to [facility] [address]      Phone: [phone]   ibuprofen 600 MG tablet         Randy Ruggiero MD  10:20 AM  11/21/22

## 2022-12-27 PROBLEM — O34.82 OVARIAN CYST AFFECTING PREGNANCY IN SECOND TRIMESTER, ANTEPARTUM: Status: RESOLVED | Noted: 2022-05-05 | Resolved: 2022-12-27

## 2022-12-27 PROBLEM — N83.209 OVARIAN CYST AFFECTING PREGNANCY IN SECOND TRIMESTER, ANTEPARTUM: Status: RESOLVED | Noted: 2022-05-05 | Resolved: 2022-12-27

## 2022-12-27 PROBLEM — O42.919 PRETERM PREMATURE RUPTURE OF MEMBRANES (PPROM) WITH UNKNOWN ONSET OF LABOR: Status: RESOLVED | Noted: 2022-11-16 | Resolved: 2022-12-27

## 2022-12-27 NOTE — PROGRESS NOTES
2022    J[place]ica Files  1990  Age: 29 y.o.       female 6 weeks s/p vag delivery. No complaints. Hasn't had intercourse since delivery. Pregnancy and delivery were complicated by GDM, sickle trait. Patient feels comfortable with caring for the baby. Breastfeeding yes. Plans abstinence for birth control. Pt has a renal mass- needs bx PP. Pt says she has f/up appt. Us prenatal was suspicious for bilateral dermoids. Was supposed to be seeing Hillary Veronica this appt to discuss surgery with him. She also c/o discomfort in midline below clitoris that started a wk ago. Had bilat labial tears, small, not repaired, at delivery. Last pap: NL cytology with jessica 3-2022. No hx abnl paps  History of GDM - yes, diet controlled    /76   Ht 5' 2\" (1.575 m)   Wt 142 lb (64.4 kg)   Breastfeeding Yes   BMI 25.97 kg/m²     Affect appropriate and bright  Neck supple w/o thyromegaly  Breasts w/o masses or axillary lymphadenopathy  Abdomen soft and nontender. No masses noted. Normal externalia genitalia. There is no swelling, mass, tear, inflammation anywhere on vulva, aimee near clitoris  Uterus and adnexae nontender and normal size. Bladder full. Exam limited. General Sleigh was seen today for postpartum care. Diagnoses and all orders for this visit:    Encounter for postpartum care and examination after delivery    History of diet controlled gestational diabetes mellitus (GDM)    Bilateral ovarian cysts    Exam reassuring in regard to the periclitoral pain. Rec motrin bid for a few days. If persistent to appt with Fitz, let him know so he can talia exam.  He will most likely want updated US scheduled then appt with him. Needs 2hr 75gm gtt for hx gdm.     Jacky Espinoza MD, MD

## 2022-12-28 ENCOUNTER — POSTPARTUM VISIT (OUTPATIENT)
Dept: OBGYN CLINIC | Age: 32
End: 2022-12-28

## 2022-12-28 VITALS
DIASTOLIC BLOOD PRESSURE: 76 MMHG | HEIGHT: 62 IN | WEIGHT: 142 LBS | BODY MASS INDEX: 26.13 KG/M2 | SYSTOLIC BLOOD PRESSURE: 114 MMHG

## 2022-12-28 DIAGNOSIS — Z86.32 HISTORY OF DIET CONTROLLED GESTATIONAL DIABETES MELLITUS (GDM): ICD-10-CM

## 2022-12-28 DIAGNOSIS — N83.201 BILATERAL OVARIAN CYSTS: ICD-10-CM

## 2022-12-28 DIAGNOSIS — N83.202 BILATERAL OVARIAN CYSTS: ICD-10-CM

## 2022-12-28 PROCEDURE — 99902 PR PRENATAL VISIT: CPT | Performed by: OBSTETRICS & GYNECOLOGY

## 2022-12-29 ENCOUNTER — NURSE ONLY (OUTPATIENT)
Dept: OBGYN CLINIC | Age: 32
End: 2022-12-29

## 2022-12-29 DIAGNOSIS — Z86.32 HISTORY OF DIET CONTROLLED GESTATIONAL DIABETES MELLITUS (GDM): ICD-10-CM

## 2022-12-29 LAB
GLUCOSE 1 HOUR: 159 MG/DL
GLUCOSE P FAST SERPL-MCNC: 78 MG/DL
GLUCOSE TOLERANCE TEST 2 HOUR: 133 MG/DL
GLUCOSE TOLERANCE: NORMAL

## 2023-01-23 ENCOUNTER — OFFICE VISIT (OUTPATIENT)
Dept: OBGYN CLINIC | Age: 33
End: 2023-01-23
Payer: COMMERCIAL

## 2023-01-23 VITALS — WEIGHT: 141 LBS | DIASTOLIC BLOOD PRESSURE: 72 MMHG | BODY MASS INDEX: 25.79 KG/M2 | SYSTOLIC BLOOD PRESSURE: 118 MMHG

## 2023-01-23 DIAGNOSIS — D25.1 INTRAMURAL AND SUBSEROUS LEIOMYOMA OF UTERUS: ICD-10-CM

## 2023-01-23 DIAGNOSIS — N83.202 BILATERAL OVARIAN CYSTS: Primary | ICD-10-CM

## 2023-01-23 DIAGNOSIS — D25.2 INTRAMURAL AND SUBSEROUS LEIOMYOMA OF UTERUS: ICD-10-CM

## 2023-01-23 DIAGNOSIS — N83.201 BILATERAL OVARIAN CYSTS: Primary | ICD-10-CM

## 2023-01-23 PROBLEM — O09.92 HIGH-RISK PREGNANCY IN SECOND TRIMESTER: Status: RESOLVED | Noted: 2022-06-07 | Resolved: 2023-01-23

## 2023-01-23 PROCEDURE — 76830 TRANSVAGINAL US NON-OB: CPT | Performed by: OBSTETRICS & GYNECOLOGY

## 2023-01-23 PROCEDURE — 99215 OFFICE O/P EST HI 40 MIN: CPT | Performed by: OBSTETRICS & GYNECOLOGY

## 2023-01-23 RX ORDER — PHENOL 1.4 %
1 AEROSOL, SPRAY (ML) MUCOUS MEMBRANE DAILY
COMMUNITY

## 2023-01-23 NOTE — PROGRESS NOTES
Margaretmary Osler  is a 29 y.o. female, , Patient's last menstrual period was 2023 (exact date). ,  who presents with f/u of bilateral ovarian masses (likely dermoids) and fibroids, which were noted and we had discussed surgery, however she became pregnant and delivered with PPROM @ 36 wks. Is breastfeeding, but did have a menses last week. US today shows a 9.6 x 8.2 cm left ovarian mass, c/w dermoids, and a smaller 2cm RIGHT ov mass,  The left has increased from prev US but the one on the right is smaller, possible small dermoid as well. Two fibroids, largest is 3 x 3 cms type 5-6 / posterior. Smaller is about 1cm anterior IM type 4  Has renal MRI scheduled for 8 days from now. Contraception:  none  HISTORY:  Margaretmary Osler     has a past medical history of Chronic kidney disease, Constipation in pregnancy, second trimester, Gestational diabetes mellitus, Gestational diabetes mellitus (GDM) in third trimester, Nausea and vomiting in pregnancy, and Sickle cell trait in mother affecting pregnancy ([facility] has a past surgical history that includes [place] tooth extraction ( & ). .    Her current meds are   Current Outpatient Medications:     calcium carbonate 600 MG TABS tablet, Take 1 tablet by mouth daily, Disp: , Rfl:     ibuprofen (ADVIL;MOTRIN) 600 MG tablet, Take 1 tablet by mouth every 6 hours as needed for Pain, Disp: 60 tablet, Rfl: 0    Prenatal MV & Min w/FA-DHA (ONE A DAY PRENATAL PO), Take by mouth, Disp: , Rfl:      Family history is significant for family history includes Asthma in her brother and father; Diabetes in her maternal grandfather. .    ROS:Review of Systems   Constitutional: Negative. Genitourinary: Negative. No results found for this visit on 23. PHYSICAL EXAM:Blood pressure 118/72, weight 141 lb (64 kg), last menstrual period 2023, currently breastfeeding. Body mass index is 25.79 kg/m². A&Ox3.   NAD  NL thought/judgment  Psych - grossly NL, not anxious  Neuro - CN 2-12 grossly intact. NL gait and movement  HEENT - NC/AT EOMi, PERRL  Neck - supple, no thyromegaly  Pelvic: Deferred    ASSESSMENT / PLAN:  Padmini Vegas was seen today for follow-up. Diagnoses and all orders for this visit:    Bilateral ovarian cysts  -     AMB POC US, TRANSVAGINAL    Intramural and subserous leiomyoma of uterus, s/p 36 wk delivery with PPROM. Rec myomectomy of the larger 3.5 cm fibroid, smaller likely to be unable to be identified. Complex surgery risks given the size of the cysts and location (posterior) of the fibroid. Risks to future pregnancies and need for CS discussed. - NEED Renal MRI results and f.u indications first. This has been scheduled by her MD for next week. Rec proceed with robotic assisted myomectomy and removal of bilateral ovarian cysts. No follow-ups on file.     Larry Dunn MD   .

## 2023-01-24 ENCOUNTER — CLINICAL DOCUMENTATION (OUTPATIENT)
Dept: OBGYN CLINIC | Age: 33
End: 2023-01-24

## 2023-01-24 NOTE — PROGRESS NOTES
Attempted to contact patient to discuss possible dates for recommended surgery per Dr Christina Garsia (RA bilateral ovarian cystectomy and Myomectomy). N/A, L/M on voicemail for patient to return my call at her earliest convenience.

## 2023-01-31 ENCOUNTER — HOSPITAL ENCOUNTER (OUTPATIENT)
Dept: MRI IMAGING | Age: 33
Discharge: HOME | End: 2023-02-03
Payer: COMMERCIAL

## 2023-01-31 DIAGNOSIS — R19.09 GROIN SWELLING: ICD-10-CM

## 2023-01-31 PROCEDURE — 6360000004 HC RX CONTRAST MEDICATION: Performed by: NURSE PRACTITIONER

## 2023-01-31 PROCEDURE — A9579 GAD-BASE MR CONTRAST NOS,1ML: HCPCS | Performed by: NURSE PRACTITIONER

## 2023-01-31 PROCEDURE — 74183 MRI ABD W/O CNTR FLWD CNTR: CPT

## 2023-01-31 RX ADMIN — GADOTERIDOL 13 ML: 279.3 INJECTION, SOLUTION INTRAVENOUS at 07:57

## 2023-02-21 ENCOUNTER — CLINICAL DOCUMENTATION (OUTPATIENT)
Dept: OBGYN CLINIC | Age: 33
End: 2023-02-21

## 2023-02-21 NOTE — PROGRESS NOTES
Pt has failed to return calls regarding surgery scheduling. Letter mailed asking her to call me if she wants to proceed.

## 2023-03-20 ENCOUNTER — TELEPHONE (OUTPATIENT)
Dept: OBGYN CLINIC | Age: 33
End: 2023-03-20

## 2023-03-20 RX ORDER — FLUCONAZOLE 100 MG/1
100 TABLET ORAL DAILY
Qty: 10 TABLET | Refills: 0 | Status: SHIPPED | OUTPATIENT
Start: 2023-03-20 | End: 2023-03-30

## 2023-03-20 NOTE — TELEPHONE ENCOUNTER
Pt called stating she is currently breastfeeding and her  was diagnosed with thrush. Pt c/o \"cracked\" nipples and breast pain despite using rx nipple cream. Reviewed with , per  to send in Difulcan 100 mg q day x 10 days. Pt notified. Rx sent. Pt instructed to RTO if s/sx persist. Pt voiced understanding.